# Patient Record
Sex: FEMALE
[De-identification: names, ages, dates, MRNs, and addresses within clinical notes are randomized per-mention and may not be internally consistent; named-entity substitution may affect disease eponyms.]

---

## 2021-01-14 ENCOUNTER — NON-APPOINTMENT (OUTPATIENT)
Age: 86
End: 2021-01-14

## 2021-01-14 ENCOUNTER — APPOINTMENT (OUTPATIENT)
Dept: OPHTHALMOLOGY | Facility: CLINIC | Age: 86
End: 2021-01-14
Payer: MEDICARE

## 2021-01-14 PROCEDURE — 92002 INTRM OPH EXAM NEW PATIENT: CPT

## 2021-09-24 ENCOUNTER — NON-APPOINTMENT (OUTPATIENT)
Age: 86
End: 2021-09-24

## 2021-09-24 ENCOUNTER — APPOINTMENT (OUTPATIENT)
Dept: OPHTHALMOLOGY | Facility: CLINIC | Age: 86
End: 2021-09-24

## 2021-09-24 PROBLEM — Z00.00 ENCOUNTER FOR PREVENTIVE HEALTH EXAMINATION: Status: ACTIVE | Noted: 2021-09-24

## 2021-09-27 ENCOUNTER — NON-APPOINTMENT (OUTPATIENT)
Age: 86
End: 2021-09-27

## 2021-09-27 ENCOUNTER — APPOINTMENT (OUTPATIENT)
Dept: OPHTHALMOLOGY | Facility: CLINIC | Age: 86
End: 2021-09-27
Payer: MEDICARE

## 2021-09-27 PROCEDURE — 92002 INTRM OPH EXAM NEW PATIENT: CPT

## 2022-08-09 ENCOUNTER — NON-APPOINTMENT (OUTPATIENT)
Age: 87
End: 2022-08-09

## 2022-08-09 ENCOUNTER — APPOINTMENT (OUTPATIENT)
Dept: GERIATRICS | Facility: CLINIC | Age: 87
End: 2022-08-09

## 2022-08-09 VITALS
BODY MASS INDEX: 25.52 KG/M2 | DIASTOLIC BLOOD PRESSURE: 60 MMHG | SYSTOLIC BLOOD PRESSURE: 100 MMHG | OXYGEN SATURATION: 96 % | HEART RATE: 85 BPM | WEIGHT: 130 LBS | HEIGHT: 60 IN | RESPIRATION RATE: 16 BRPM

## 2022-08-09 DIAGNOSIS — T46.2X1A POISONING BY OTHER ANTIDYSRHYTHMIC DRUGS, ACCIDENTAL (UNINTENTIONAL), INITIAL ENCOUNTER: ICD-10-CM

## 2022-08-09 PROCEDURE — 93000 ELECTROCARDIOGRAM COMPLETE: CPT | Mod: 59

## 2022-08-09 PROCEDURE — G0444 DEPRESSION SCREEN ANNUAL: CPT | Mod: 59

## 2022-08-12 LAB
ALBUMIN SERPL ELPH-MCNC: 3.9 G/DL
ALP BLD-CCNC: 82 U/L
ALT SERPL-CCNC: 23 U/L
ANION GAP SERPL CALC-SCNC: 10 MMOL/L
AST SERPL-CCNC: 21 U/L
BASOPHILS # BLD AUTO: 0.02 K/UL
BASOPHILS NFR BLD AUTO: 0.3 %
BILIRUB SERPL-MCNC: 0.4 MG/DL
BUN SERPL-MCNC: 42 MG/DL
CALCIUM SERPL-MCNC: 9.4 MG/DL
CHLORIDE SERPL-SCNC: 103 MMOL/L
CHOLEST SERPL-MCNC: 229 MG/DL
CO2 SERPL-SCNC: 25 MMOL/L
CREAT SERPL-MCNC: 1.71 MG/DL
EGFR: 28 ML/MIN/1.73M2
EOSINOPHIL # BLD AUTO: 0.08 K/UL
EOSINOPHIL NFR BLD AUTO: 1.3 %
GLUCOSE SERPL-MCNC: 105 MG/DL
HCT VFR BLD CALC: 40 %
HDLC SERPL-MCNC: 78 MG/DL
HGB BLD-MCNC: 13.2 G/DL
IMM GRANULOCYTES NFR BLD AUTO: 0.3 %
LDLC SERPL CALC-MCNC: 128 MG/DL
LYMPHOCYTES # BLD AUTO: 1.7 K/UL
LYMPHOCYTES NFR BLD AUTO: 27.3 %
MAN DIFF?: NORMAL
MCHC RBC-ENTMCNC: 32.4 PG
MCHC RBC-ENTMCNC: 33 GM/DL
MCV RBC AUTO: 98 FL
MONOCYTES # BLD AUTO: 0.62 K/UL
MONOCYTES NFR BLD AUTO: 10 %
NEUTROPHILS # BLD AUTO: 3.79 K/UL
NEUTROPHILS NFR BLD AUTO: 60.8 %
NONHDLC SERPL-MCNC: 151 MG/DL
PLATELET # BLD AUTO: 90 K/UL
POTASSIUM SERPL-SCNC: 4.7 MMOL/L
PROT SERPL-MCNC: 6.3 G/DL
RBC # BLD: 4.08 M/UL
RBC # FLD: 14.2 %
SODIUM SERPL-SCNC: 138 MMOL/L
TRIGL SERPL-MCNC: 115 MG/DL
TSH SERPL-ACNC: 3 UIU/ML
WBC # FLD AUTO: 6.23 K/UL

## 2022-08-18 PROBLEM — T46.2X1A AMIODARONE TOXICITY: Status: ACTIVE | Noted: 2022-08-09

## 2022-08-18 NOTE — HISTORY OF PRESENT ILLNESS
[Patient reported osteoporosis screening was abnormal] : Patient reported osteoporosis screening was abnormal [Patient reported hearing was normal] : Patient reported hearing was normal [Patient reported vision is normal] : Patient reported vision is normal [Patient reported Patient reported dental screening is normal] : Patient reported dental screening is normal [Patient declined colon rectal/cancer screening] : Patient declined colon/rectal cancer screening [Patient reported skin cancer screening was normal] : Patient reported skin cancer screening was normal [Patient declined breast sonogram] : Patient declined breast sonogram [Patient declined cervical cancer screening] : Patient declined cervical cancer screening [No falls in past year] : Patient reported no falls in the past year [1] : 2) Feeling down, depressed, or hopeless for several days (1) [I have developed a follow-up plan documented below in the note.] : I have developed a follow-up plan documented below in the note. [Designated Healthcare Proxy] : Designated healthcare proxy [Name: ___] : Health Care Proxy's Name: [unfilled]  [Relationship: ___] : Relationship: [unfilled] [FreeTextEntry1] : 88 yo female , retired physician (Geriatrician)with sob, pulm htn, [facility] Chang Bermeo MD.  Pt had covid, now on home oxygen.  Pt was hospitalized and now home Pt has stable weight following hospitalization for remdesivir one month ago - July 15.\par \par Dyspneic - small concentrtor for oxygen used. \par \par Pt ECHO shows MR  and has pulm htn(?) and covid. Also has afib and was on amiodarone\par \par Pt has not had pulmonary rehab ever. 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

## 2022-08-18 NOTE — REVIEW OF SYSTEMS
[Feeling Tired] : feeling tired [As Noted in HPI] : as noted in HPI [Depression] : depression [Negative] : Heme/Lymph [Fever] : no fever [Chills] : no chills [Feeling Poorly] : not feeling poorly

## 2022-08-18 NOTE — PHYSICAL EXAM
[No Acc Muscle Use] : no accessory muscle use [Auscultation Breath Sounds / Voice Sounds] : lungs were clear to auscultation bilaterally [Normal] : no focal deficits [Oriented To Time, Place, And Person] : oriented to person, place, and time

## 2022-08-18 NOTE — SOCIAL HISTORY
[With spouse] : lives with spouse [Apartment] : in an apartment [FreeTextEntry1] : Jo Ann Aly [FreeTextEntry4] : dtr

## 2022-10-24 ENCOUNTER — NON-APPOINTMENT (OUTPATIENT)
Age: 87
End: 2022-10-24

## 2022-11-04 ENCOUNTER — TRANSCRIPTION ENCOUNTER (OUTPATIENT)
Age: 87
End: 2022-11-04

## 2022-11-08 ENCOUNTER — TRANSCRIPTION ENCOUNTER (OUTPATIENT)
Age: 87
End: 2022-11-08

## 2022-11-09 ENCOUNTER — TRANSCRIPTION ENCOUNTER (OUTPATIENT)
Age: 87
End: 2022-11-09

## 2022-11-10 ENCOUNTER — NON-APPOINTMENT (OUTPATIENT)
Age: 87
End: 2022-11-10

## 2022-11-10 ENCOUNTER — TRANSCRIPTION ENCOUNTER (OUTPATIENT)
Age: 87
End: 2022-11-10

## 2022-11-11 ENCOUNTER — APPOINTMENT (OUTPATIENT)
Dept: GERIATRICS | Facility: CLINIC | Age: 87
End: 2022-11-11

## 2022-11-11 PROCEDURE — 99214 OFFICE O/P EST MOD 30 MIN: CPT | Mod: 95

## 2022-11-14 ENCOUNTER — TRANSCRIPTION ENCOUNTER (OUTPATIENT)
Age: 87
End: 2022-11-14

## 2022-11-14 NOTE — REVIEW OF SYSTEMS
[Feeling Poorly] : not feeling poorly [Feeling Tired] : not feeling tired [Chest Pain] : no chest pain [Palpitations] : no palpitations [Cough] : no cough [SOB on Exertion] : no shortness of breath during exertion [Constipation] : no constipation [Anxiety] : no anxiety [Depression] : no depression

## 2022-11-14 NOTE — HISTORY OF PRESENT ILLNESS
[Home] : at home, [unfilled] , at the time of the visit. [Medical Office: (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 Pt. being seen for acute telehealth for wound on right shin.  \par \par Pt. reported she was at the [employer] for an exhibition Monday 11/7/22 . It was a crowded area,she bumped her right leg and it started bleeding.  No other fall/injury. Reports she was putting xeroform but it is really hard to bend down to place a dressing. Pt. needs assistance with ambulating. \par \par

## 2022-11-22 ENCOUNTER — APPOINTMENT (OUTPATIENT)
Dept: GERIATRICS | Facility: CLINIC | Age: 87
End: 2022-11-22

## 2022-11-22 PROCEDURE — 99215 OFFICE O/P EST HI 40 MIN: CPT | Mod: 95

## 2022-11-23 NOTE — PHYSICAL EXAM
[Alert] : alert [No Acute Distress] : in no acute distress [No Respiratory Distress] : no respiratory distress [No Acc Muscle Use] : no accessory muscle use [Respiration, Rhythm And Depth] : normal respiratory rhythm and effort [Oriented To Time, Place, And Person] : oriented to person, place, and time [Normal Affect] : the affect was normal [Normal Insight/Judgment] : insight and judgment were intact [Normal Mood] : the mood was normal [de-identified] : RLE shin with healing, periwound skin erythematous, no exudate

## 2022-11-23 NOTE — HISTORY OF PRESENT ILLNESS
[FreeTextEntry1] : ELAINE LEVENTHAL is an 89 yo woman with PMH of pulmonary HTN, HTN, afib, (on eliquis 2.5 mg bid), CHF, scoliosis and recurrent skin tears who presents for follow up evaluation of RLE wound.\par \par RLE wound:\par -initially seen on 11/11 for wound evaluation \par -patient is receiving wound care services at home\par -she has significant scoliosis and is unable to bend in order to do wound care independently \par -started on keflex 250mg bid on 11/15\par -sent picture of wound to practice from yesterday when wound care nurse was doing dressing change\par -wound itself does not appear infected, but periwound skin is still red\par -patient states that it has improved significantly since starting abx; states that b/l LE are typically warm/red due to chronic vascular disease \par -states that wound is still a bit painful; taking tylenol 1000mg  prn \par -mild swelling of RLE, denies calf pain  [0] : 2) Feeling down, depressed, or hopeless: Not at all (0) [PHQ-2 Negative - No further assessment needed] : PHQ-2 Negative - No further assessment needed [XMR3Mgkyr] : 0

## 2022-11-23 NOTE — REVIEW OF SYSTEMS
[Feeling Poorly] : not feeling poorly [Feeling Tired] : not feeling tired [Discharge From Eyes] : no purulent discharge from the eyes [Dry Eyes] : no dryness of the eyes [Nasal Discharge] : no nasal discharge [Sore Throat] : no sore throat [Chest Pain] : no chest pain [Palpitations] : no palpitations [Shortness Of Breath] : no shortness of breath [Wheezing] : no wheezing [Cough] : no cough [SOB on Exertion] : no shortness of breath during exertion [Abdominal Pain] : no abdominal pain [Vomiting] : no vomiting [Constipation] : no constipation [Diarrhea] : no diarrhea [Dysuria] : no dysuria [Limb Pain] : no limb pain [Skin Wound] : skin wound

## 2022-11-29 ENCOUNTER — APPOINTMENT (OUTPATIENT)
Dept: GERIATRICS | Facility: CLINIC | Age: 87
End: 2022-11-29

## 2022-11-29 PROCEDURE — 99215 OFFICE O/P EST HI 40 MIN: CPT | Mod: 95

## 2022-12-04 NOTE — HISTORY OF PRESENT ILLNESS
[0] : 2) Feeling down, depressed, or hopeless: Not at all (0) [PHQ-2 Negative - No further assessment needed] : PHQ-2 Negative - No further assessment needed [FreeTextEntry1] : ELAINE LEVENTHAL is an 88 yo woman with PMH of pulmonary HTN, HTN, afib, (on eliquis 2.5 mg bid), CHF, scoliosis and recurrent skin tears who presents for follow up evaluation of RLE wound.\par \par RLE wound:\par -initially seen on 11/11 for wound evaluation \par -patient is receiving wound care services at home tid\par -she has significant scoliosis and is unable to bend in order to do wound care independently \par -started on keflex 250mg bid on 11/15, completed on 11/25/22\par -states that wound looks like it has improved; dressing was just changed earlier today  \par -states that wound is still a bit painful; taking tylenol 1000mg  prn \par  [AMQ8Kutsq] : 0

## 2022-12-04 NOTE — REASON FOR VISIT
[Follow-Up] : a follow-up visit [Home] : at home, [unfilled] , at the time of the visit. [Medical Office: ([facility])___] : at the medical office located in  [Patient] : the patient [This encounter was initiated by telehealth (audio with video) and converted to telephone (audio only) due to technical difficulties.] : This encounter was initiated by telehealth (audio with video) and converted to telephone (audio only) due to technical difficulties. [FreeTextEntry1] : wound

## 2022-12-04 NOTE — PHYSICAL EXAM
[Alert] : alert [No Respiratory Distress] : no respiratory distress [No Acc Muscle Use] : no accessory muscle use [Respiration, Rhythm And Depth] : normal respiratory rhythm and effort [Oriented To Time, Place, And Person] : oriented to person, place, and time [Normal Affect] : the affect was normal [Normal Insight/Judgment] : insight and judgment were intact [Normal Mood] : the mood was normal [de-identified] : RLE shin with healing wound

## 2022-12-04 NOTE — REVIEW OF SYSTEMS
[Skin Wound] : skin wound [Feeling Poorly] : not feeling poorly [Feeling Tired] : not feeling tired [Discharge From Eyes] : no purulent discharge from the eyes [Dry Eyes] : no dryness of the eyes [Nasal Discharge] : no nasal discharge [Sore Throat] : no sore throat [Chest Pain] : no chest pain [Palpitations] : no palpitations [Shortness Of Breath] : no shortness of breath [Wheezing] : no wheezing [Cough] : no cough [SOB on Exertion] : no shortness of breath during exertion [Abdominal Pain] : no abdominal pain [Vomiting] : no vomiting [Constipation] : no constipation [Diarrhea] : no diarrhea [Dysuria] : no dysuria [Limb Pain] : no limb pain

## 2022-12-04 NOTE — ASSESSMENT
[FreeTextEntry1] : follow up with Dr. Vasquez on 12/15/22; available earlier PRN\par \par Maria Esther Tomlin, FNP-BC

## 2022-12-05 ENCOUNTER — APPOINTMENT (OUTPATIENT)
Dept: WOUND CARE | Facility: CLINIC | Age: 87
End: 2022-12-05

## 2022-12-05 DIAGNOSIS — Z87.39 PERSONAL HISTORY OF OTHER DISEASES OF THE MUSCULOSKELETAL SYSTEM AND CONNECTIVE TISSUE: ICD-10-CM

## 2022-12-05 PROCEDURE — 99423 OL DIG E/M SVC 21+ MIN: CPT

## 2022-12-05 NOTE — ASSESSMENT
[FreeTextEntry1] : Wound Assessment and Plan:\par \par The patient presents with a wound to the RLE-.  Swelling noted to the extremity with erythema secondary to venous stasis. Erythema marked on skin by daughter to note for any increase. Daughter states that the pulses are weakly palpable.\par THIAGO/PVR and standing venous reflux study scheduled for follow up vascular surgery appointment\par No clinical sign of infection at this time.\par Recommendation:\par \par Apply lidocaine or topical anesthetic if needed to reduce pain upon washing the wound.\par Wash wound with ----0.9% saline/ Dakins 0.125%\par Apply ----Johanna once wound is granulating\par Apply ----tubigrip\par Change dressing ---daily/ every other day/ 3 times per week.\par Leg elevation as tolerated\par Encouraged ambulation or exercise.\par Optimization of nutrition.\par Offloading to the wound site.\par \par -----Wound supplies ordered via DME\par Patient given contact information to [facility]\par \par -----Homecare orders in place\[phone]\par She is only able to order Dakins 0.125% and will mechanically debride the wound  3 times per week\par \par Follow up appointment scheduled for  1 week\par \par TeleHealth Services discussed with the patient and/or family.  Discharge instructions given including download of [place] information regarding:\par 1)  [facility] to obtain medical records\par 2)  [facility] to conduct Face-to-Face TeleHealth visit\par 3)  [facility] for the Patient (patient takes a picture of their wound which is sent to the patient's chart for review)\par

## 2022-12-05 NOTE — HISTORY OF PRESENT ILLNESS
[Home] : at home, [unfilled] , at the time of the visit. [Medical Office: ([facility])___] : at the medical office located in  [Spouse] : spouse [Family Member] : family member [Verbal consent obtained from patient] : the patient, [unfilled] [FreeTextEntry3] : D [FreeTextEntry4] : daughter [FreeTextEntry1] : Ms. 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 The wound is located on  the RLE and nonhealing .  The patient has complaints of painful wound.   The patient has been dressing the wound with adaptic and mupirocin-. Patient can't see what it looks like. The patient denies fevers or chills.  The patient has localized pain to the wound upon dressing changes.  The patient has no other complaints or associated symptoms. Her daughter,  Jo Ann Aly is present with her .  She is receiving care through [facility] by Haritha at [phone]\par

## 2022-12-05 NOTE — PLAN
[FreeTextEntry1] : Patient to follow up with vascular surgery at [facility] for further workup.\par

## 2022-12-14 ENCOUNTER — APPOINTMENT (OUTPATIENT)
Dept: WOUND CARE | Facility: CLINIC | Age: 87
End: 2022-12-14

## 2022-12-14 PROCEDURE — 99213 OFFICE O/P EST LOW 20 MIN: CPT | Mod: 95

## 2022-12-15 ENCOUNTER — APPOINTMENT (OUTPATIENT)
Dept: GERIATRICS | Facility: CLINIC | Age: 87
End: 2022-12-15

## 2022-12-15 VITALS
TEMPERATURE: 98 F | WEIGHT: 135 LBS | HEART RATE: 68 BPM | SYSTOLIC BLOOD PRESSURE: 90 MMHG | DIASTOLIC BLOOD PRESSURE: 56 MMHG | OXYGEN SATURATION: 97 % | RESPIRATION RATE: 15 BRPM | BODY MASS INDEX: 26.37 KG/M2

## 2022-12-15 PROCEDURE — 99214 OFFICE O/P EST MOD 30 MIN: CPT

## 2022-12-15 RX ORDER — CEPHALEXIN 250 MG/1
250 CAPSULE ORAL
Qty: 6 | Refills: 0 | Status: COMPLETED | COMMUNITY
Start: 2022-11-15 | End: 2022-12-15

## 2022-12-15 RX ORDER — CEPHALEXIN 500 MG/1
500 CAPSULE ORAL
Qty: 14 | Refills: 0 | Status: COMPLETED | COMMUNITY
Start: 2022-08-15 | End: 2022-12-15

## 2022-12-15 NOTE — ASSESSMENT
[FreeTextEntry1] : Wound Assessment and Plan:\par \par The patient presents with a wound to the RLE-.  Swelling noted to the extremity with erythema secondary to venous stasis. Erythema marked on skin by daughter to note for any increase. Daughter states that the pulses are weakly palpable.\par THIAGO/PVR and standing venous reflux study scheduled for follow up vascular surgery appointment\par No clinical sign of infection at this time.\par Recommendation:\par \par Apply lidocaine or topical anesthetic if needed to reduce pain upon washing the wound.\par Wash wound with ----0.9% saline/ Dakins 0.125%\par Apply ----Johanna once wound is granulating\par Apply ----tubigrip\par Change dressing ---daily/ every other day/ 3 times per week.\par Leg elevation as tolerated\par Encouraged ambulation or exercise.\par Optimization of nutrition.\par Offloading to the wound site.\par \par -----Wound supplies ordered via DME\par Patient given contact information to DME\par \par -----Homecare orders in place\par [facility] [phone]\par She is only able to order Dakins 0.125% and will mechanically debride the wound  3 times per week\par \par Follow up appointment scheduled for  1 week\par \par TeleHealth Services discussed with the patient and/or family.  Discharge instructions given including download of [facility] information regarding:\par 1)  [facility] to obtain medical records\par 2)  [facility] to conduct Face-to-Face TeleHealth visit\par 3)  [facility] for the Patient (patient takes a picture of their wound which is sent to the patient's chart for review)\par 12/14/22\par right calf wound improved since last visit, black scabbing is lifting, exposing partial pink tissue\par reports frustration with trying to secure a appt. with vascular at [facility], will contact and relay message

## 2022-12-15 NOTE — HISTORY OF PRESENT ILLNESS
[Home] : at home, [unfilled] , at the time of the visit. [Medical Office: ([facility])___] : at the medical office located in  [Spouse] : spouse [Family Member] : family member [Verbal consent obtained from patient] : the patient, [unfilled] [FreeTextEntry1] : Ms. 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 The wound is located on  the RLE and nonhealing .  The patient has complaints of painful wound.   The patient has been dressing the wound with adaptic and mupirocin-. Patient can't see what it looks like. The patient denies fevers or chills.  The patient has localized pain to the wound upon dressing changes.  The patient has no other complaints or associated symptoms. Her daughter,  Jo Ann Aly is present with her .  She is receiving care through [facility] by Haritha at [phone]\par  [FreeTextEntry4] : daughter

## 2022-12-15 NOTE — PLAN
[FreeTextEntry1] : 12/14/22\par Plan - reached out to Dr. Aviles office, staff will reach out to pt. to set up vascular evaluation\par c/w dakins moistened to leg, pt. has nurse\par follow up TEB

## 2022-12-20 NOTE — PHYSICAL EXAM
[Normal] : no focal deficits [Oriented To Time, Place, And Person] : oriented to person, place, and time [No Respiratory Distress] : no respiratory distress [No Acc Muscle Use] : no accessory muscle use [Respiration, Rhythm And Depth] : normal respiratory rhythm and effort [Pedal Pulses Normal] : the pedal pulses are present [de-identified] : Scattered end-expiratory wheezing  [de-identified] : Trace non-pitting bilateral lower extremity edema, R>L  [de-identified] : Wound on anterior aspect of RLE, healing with granulation tissue noted. No pus/purulent discharge.

## 2022-12-20 NOTE — REVIEW OF SYSTEMS
[As Noted in HPI] : as noted in HPI [Depression] : depression [Negative] : Endocrine [Lower Ext Edema] : lower extremity edema [Easy Bleeding] : a tendency for easy bleeding [Fever] : no fever [Chills] : no chills [Feeling Poorly] : not feeling poorly [Feeling Tired] : not feeling tired [Heart Rate Is Slow] : the heart rate was not slow [Heart Rate Is Fast] : the heart rate was not fast [Chest Pain] : no chest pain [Palpitations] : no palpitations

## 2022-12-20 NOTE — HISTORY OF PRESENT ILLNESS
[Patient reported osteoporosis screening was abnormal] : Patient reported osteoporosis screening was abnormal [Patient reported hearing was normal] : Patient reported hearing was normal [Patient reported vision is normal] : Patient reported vision is normal [Patient reported Patient reported dental screening is normal] : Patient reported dental screening is normal [Patient declined colon rectal/cancer screening] : Patient declined colon/rectal cancer screening [Patient reported skin cancer screening was normal] : Patient reported skin cancer screening was normal [Patient declined breast sonogram] : Patient declined breast sonogram [Patient declined cervical cancer screening] : Patient declined cervical cancer screening [No falls in past year] : Patient reported no falls in the past year [1] : 2) Feeling down, depressed, or hopeless for several days (1) [I have developed a follow-up plan documented below in the note.] : I have developed a follow-up plan documented below in the note. [Designated Healthcare Proxy] : Designated healthcare proxy [Name: ___] : Health Care Proxy's Name: [unfilled]  [Reviewed no changes] : Reviewed, no changes [Relationship: ___] : Relationship: [unfilled] [I will adhere to the patient's wishes.] : I will adhere to the patient's wishes. 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 \par \par Since last office visit, no new falls, ED/urgent care/hospital visits. She had RSV after Thanksgiving - now recovering from symptoms. \par \par ?Pulm HTN: Prior echocardiogram significant for mild-mod MR, mod TR, trivial NC, hyperdynamic LVSF (EF 70%). She started pulmonary rehabilitation for deconditioning - good experience so far. \par \par RLE Wound/Cellulitis: She is still being followed by visiting nurses ([facility]NS) for wound care sustained in Nov 2022 after hitting concentrator. She finished course of Keflex approximately one week ago.  She notes yellow drainage on gauze - though improving. She takes Tylenol as needed for pain.\par \par Caregiver [place]/Depression:  Leventhal is caregiver for her  - which is a source of stress. She reports that she is sleeping and eating well and participating in activities she enjoys, including going to the theater and [place]. \par \par Immunizations: UTD with Flu and Covid vaccines. She reports she received the Shingles vaccine "many years ago."  [AdvancecareDate] : 12/22

## 2022-12-20 NOTE — SOCIAL HISTORY
[With spouse] : lives with spouse [Apartment] : in an apartment [FreeTextEntry1] : Jo Ann Aly [FreeTextEntry4] : Daughter

## 2022-12-20 NOTE — END OF VISIT
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  WOund healing and with granulation tissue.  She knows hse is frustrated with her situaiton. She does not think she is depressed and does not want to consider antidepressants. She is doing PT and enjoys it. Feels it is helping her.

## 2022-12-21 ENCOUNTER — APPOINTMENT (OUTPATIENT)
Dept: VASCULAR SURGERY | Facility: CLINIC | Age: 87
End: 2022-12-21

## 2022-12-21 VITALS
SYSTOLIC BLOOD PRESSURE: 112 MMHG | WEIGHT: 130 LBS | HEART RATE: 90 BPM | HEIGHT: 61 IN | DIASTOLIC BLOOD PRESSURE: 74 MMHG | BODY MASS INDEX: 24.55 KG/M2

## 2022-12-21 PROCEDURE — 93971 EXTREMITY STUDY: CPT

## 2022-12-21 RX ORDER — FLUTICASONE PROPIONATE AND SALMETEROL XINAFOATE 230; 21 UG/1; UG/1
230-21 AEROSOL, METERED RESPIRATORY (INHALATION) TWICE DAILY
Refills: 0 | Status: DISCONTINUED | COMMUNITY
Start: 2022-06-30 | End: 2022-12-21

## 2022-12-21 RX ORDER — APIXABAN 2.5 MG/1
2.5 TABLET, FILM COATED ORAL
Qty: 180 | Refills: 0 | Status: DISCONTINUED | COMMUNITY
Start: 2021-06-28 | End: 2022-12-21

## 2022-12-21 RX ORDER — EMPAGLIFLOZIN 10 MG/1
10 TABLET, FILM COATED ORAL DAILY
Refills: 0 | Status: DISCONTINUED | COMMUNITY
Start: 2022-05-23 | End: 2022-12-21

## 2022-12-21 RX ORDER — BETAMETHASONE DIPROPIONATE 0.5 MG/G
0.05 CREAM, AUGMENTED TOPICAL
Qty: 45 | Refills: 0 | Status: DISCONTINUED | COMMUNITY
Start: 2022-04-22 | End: 2022-12-21

## 2022-12-21 RX ORDER — LIDOCAINE AND PRILOCAINE 25; 25 MG/G; MG/G
2.5-2.5 CREAM TOPICAL
Qty: 1 | Refills: 3 | Status: DISCONTINUED | COMMUNITY
Start: 2022-12-05 | End: 2022-12-21

## 2022-12-22 NOTE — ADDENDUM
[FreeTextEntry1] : I, Dr. Duncan Aviles, personally performed the evaluation and management (E/M) services for this new patient.  That E/M includes conducting the initial examination, assessing all conditions, and establishing the plan of care.  Today, my ACP,SRINIVAS Carter, was here to observe my evaluation and management services for this patient to be followed going forward. \par \par \par The documentation for this encounter was entered by Michel Hyatt acting as a scribe for Dr.Gary Aviles.\par

## 2022-12-22 NOTE — ASSESSMENT
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 \par On exam, palpable peripheral pulse,  mild edema noted over the anterior shin. R anterior shin superficial 2x1 cm ulcer with partial exudate, no surrounding erythema.\par RLE Venous US demonstrates negative DVT/SVT. No reflux. Normal flow in the pop, PT, and peroneal arteries. Edema noted over the anterior shin. Small tributary measuring 1.3 mm. Wound cleaned in office today and wrapped in ace bandage. \par No vascular intervention is necessary at this time.\par Plan:\par - I recommend cleaning wound with soap and water daily. Wound care instructions given to clean the wound with hydrogen peroxide and to apply a small amount of  bacterin, then wrap R leg with ace bandage. \par - FU next week to monitor wound. \par \par \par \par

## 2022-12-22 NOTE — PHYSICAL EXAM
[Respiratory Effort] : normal respiratory effort [Alert] : alert [Oriented to Person] : oriented to person [Oriented to Place] : oriented to place [Oriented to Time] : oriented to time [Calm] : calm [0] : right 0 [Normal Rate and Rhythm] : normal rate and rhythm [2+] : right 2+ [Ankle Swelling (On Exam)] : present [Ankle Swelling Bilaterally] : bilaterally  [Ankle Swelling On The Left] : moderate [Abdomen Tenderness] : ~T ~M No abdominal tenderness [de-identified] : WN/WD, NAD [de-identified] : NC/AT [de-identified] : supple [FreeTextEntry1] : Palpable popliteal and femoral pulses bilaterally. Non palpable pulses over the R PT. Good arterial signal over the RLE heard with hand held doppler. Chronic venous stasis of bilateral LEs.  [de-identified] : FROM [de-identified] : R anterior shin superficial 2x1 cm ulcer with partial exudate, no surrounding erythema [de-identified] : grossly intact

## 2022-12-22 NOTE — PROCEDURE
[FreeTextEntry1] : RLE Venous doppler was done in the office today that reveals negative DVT/SVT. No reflux. Normal flow in the pop, PT, and peroneal arteries. Edema noted over the anterior shin. Small tributary measuring 1.3 mm. \par \par Wound cleaned with Hydrogen peroxide, Bacitracin was applied, covered with Telfa pad, leg was wrapped  with ace bandage. \par

## 2022-12-22 NOTE — HISTORY OF PRESENT ILLNESS
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 She has a PMHx of Afib (on Eliquis), HLD, depression, COPD, Amiodarone toxicity, scoliosis, osteoporosis, RSV, DVT >20 yrs ago, and RLE cellulitis and slow healing wound. She explains the wound is located over the R shin. She had bumped her leg  into a concrete structure during an exhibition at the [facility] a few months ago. She has been dressing the wound with Kerlix. It has finally started showing some signs of improvement. She has been following instructions per Dr Heck and VNS is performing wound care 3x a week. She has been cleaning with Dakin's solution, dressing it with Adaptic and applying mupirocin. She explains it is painful. Denies any fever/chills, symptoms c/w claudication, rest pain, leg swelling. \par \par \par SHx:\par -Patient a retired physician.\par \par .

## 2022-12-23 LAB
ANION GAP SERPL CALC-SCNC: 15 MMOL/L
BASOPHILS # BLD AUTO: 0.03 K/UL
BASOPHILS NFR BLD AUTO: 0.4 %
BUN SERPL-MCNC: 37 MG/DL
CALCIUM SERPL-MCNC: 9.3 MG/DL
CHLORIDE SERPL-SCNC: 100 MMOL/L
CO2 SERPL-SCNC: 22 MMOL/L
CREAT SERPL-MCNC: 1.9 MG/DL
EGFR: 25 ML/MIN/1.73M2
EOSINOPHIL # BLD AUTO: 0.04 K/UL
EOSINOPHIL NFR BLD AUTO: 0.5 %
GLUCOSE SERPL-MCNC: 115 MG/DL
HCT VFR BLD CALC: 40.3 %
HGB BLD-MCNC: 13 G/DL
IMM GRANULOCYTES NFR BLD AUTO: 0.7 %
LYMPHOCYTES # BLD AUTO: 1.6 K/UL
LYMPHOCYTES NFR BLD AUTO: 21.4 %
MAN DIFF?: NORMAL
MCHC RBC-ENTMCNC: 32.3 GM/DL
MCHC RBC-ENTMCNC: 33.6 PG
MCV RBC AUTO: 104.1 FL
MONOCYTES # BLD AUTO: 0.69 K/UL
MONOCYTES NFR BLD AUTO: 9.2 %
NEUTROPHILS # BLD AUTO: 5.07 K/UL
NEUTROPHILS NFR BLD AUTO: 67.8 %
PLATELET # BLD AUTO: 121 K/UL
POTASSIUM SERPL-SCNC: 5.1 MMOL/L
RBC # BLD: 3.87 M/UL
RBC # FLD: 13.7 %
SODIUM SERPL-SCNC: 137 MMOL/L
WBC # FLD AUTO: 7.48 K/UL

## 2022-12-28 ENCOUNTER — APPOINTMENT (OUTPATIENT)
Dept: VASCULAR SURGERY | Facility: CLINIC | Age: 87
End: 2022-12-28
Payer: MEDICARE

## 2022-12-28 VITALS
BODY MASS INDEX: 24.55 KG/M2 | HEART RATE: 87 BPM | SYSTOLIC BLOOD PRESSURE: 92 MMHG | HEIGHT: 61 IN | WEIGHT: 130 LBS | DIASTOLIC BLOOD PRESSURE: 60 MMHG

## 2022-12-28 PROCEDURE — 99213 OFFICE O/P EST LOW 20 MIN: CPT | Mod: 25

## 2022-12-28 PROCEDURE — 29580 STRAPPING UNNA BOOT: CPT | Mod: RT

## 2022-12-28 NOTE — HISTORY OF PRESENT ILLNESS
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 She has a PMHx of Afib (on Eliquis), HLD, depression, COPD, Amiodarone toxicity, scoliosis, osteoporosis, RSV, DVT >20 yrs ago, and RLE cellulitis and slow healing, traumatic wound. She was seen last week and presents for followup. She reports it is difficult for her to keep the telfa on at night and has been securing it with paper tape. She also started using the velcro lymphedema wraps given the ace bandages will compress only to a certain point in the leg and the rest will swell up significantly. Denies any fever/chills, symptoms c/w claudication, rest pain, leg swelling. It is hard for her to stay resting with leg elevated. \par \par \par SHx:\par -Patient a retired physician.

## 2022-12-28 NOTE — DATA REVIEWED
[FreeTextEntry1] : RLE Venous doppler 12/21/22: negative DVT/SVT. No reflux. Normal flow in the pop, PT, and peroneal arteries. Edema noted over the anterior shin. Small tributary measuring 1.3 mm.

## 2022-12-28 NOTE — ASSESSMENT
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 \par On exam, palpable peripheral pulse,  mild edema noted over the anterior shin. R anterior shin superficial 2x1 cm ulcer with partial exudate, no surrounding erythema.\par Wound cleaned in office today and wrapped in unna boot, kerlix and ace bandage.\par FU next week to monitor wound. \par \par \par \par

## 2022-12-28 NOTE — PHYSICAL EXAM
[Respiratory Effort] : normal respiratory effort [Normal Rate and Rhythm] : normal rate and rhythm [0] : right 0 [2+] : left 2+ [Ankle Swelling (On Exam)] : present [Ankle Swelling Bilaterally] : bilaterally  [Ankle Swelling On The Left] : moderate [Alert] : alert [Oriented to Person] : oriented to person [Oriented to Place] : oriented to place [Oriented to Time] : oriented to time [Calm] : calm [Abdomen Tenderness] : ~T ~M No abdominal tenderness [de-identified] : WN/WD, NAD [de-identified] : NC/AT [de-identified] : supple [FreeTextEntry1] : Palpable popliteal and femoral pulses bilaterally. Non palpable pulses over the R PT. Chronic venous stasis of bilateral LEs.  [de-identified] : FROM [de-identified] : R anterior shin superficial ~2x1 cm ulcer with partial exudate, no surrounding erythema [de-identified] : grossly intact

## 2022-12-28 NOTE — PROCEDURE
[FreeTextEntry1] : Wound cleaned with Hydrogen peroxide, A&D applied, leg was wrapped with unna boot, kerlix and ace bandage. \par

## 2023-01-04 ENCOUNTER — APPOINTMENT (OUTPATIENT)
Dept: VASCULAR SURGERY | Facility: CLINIC | Age: 88
End: 2023-01-04
Payer: MEDICARE

## 2023-01-04 VITALS
DIASTOLIC BLOOD PRESSURE: 61 MMHG | HEART RATE: 67 BPM | WEIGHT: 130 LBS | BODY MASS INDEX: 24.55 KG/M2 | HEIGHT: 61 IN | SYSTOLIC BLOOD PRESSURE: 91 MMHG

## 2023-01-04 PROCEDURE — 29580 STRAPPING UNNA BOOT: CPT | Mod: RT

## 2023-01-04 PROCEDURE — 99213 OFFICE O/P EST LOW 20 MIN: CPT | Mod: 25

## 2023-01-05 NOTE — ADDENDUM
[FreeTextEntry1] : I, Dr. Duncan Aviles, personally performed the evaluation and management (E/M) services for this established patient who presents today with (a) new problem(s)/exacerbation of (an) existing condition(s).  That E/M includes conducting the examination, assessing all new/exacerbated conditions, and establishing a new plan of care.  Today, my ACP, Eva Gustafson NP, was here to observe my evaluation and management services for this new problem/exacerbated condition to be followed going forward. \par \par The documentation for this encounter was entered by Michel Hyatt acting as a scribe for Dr.Gary Aviles.\par

## 2023-01-05 NOTE — HISTORY OF PRESENT ILLNESS
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 She has a PMHx of Afib (on Eliquis), HLD, depression, COPD, Amiodarone toxicity, scoliosis, osteoporosis, RSV, DVT >20 yrs ago, and RLE cellulitis and slow healing, traumatic wound. She was seen last week and presents for followup/unna boot change. She noticed swelling has significantly improved since last visit. At some point, the unna boot became loose given the how much of the edema had improved. Denies any fever/chills, symptoms c/w claudication, rest pain, leg swelling. \par \par SHx:\par -Patient a retired physician.

## 2023-01-05 NOTE — PHYSICAL EXAM
[Respiratory Effort] : normal respiratory effort [Normal Rate and Rhythm] : normal rate and rhythm [0] : right 0 [2+] : left 2+ [Ankle Swelling (On Exam)] : present [Ankle Swelling Bilaterally] : bilaterally  [Alert] : alert [Oriented to Person] : oriented to person [Oriented to Place] : oriented to place [Oriented to Time] : oriented to time [Calm] : calm [Ankle Swelling On The Right] : mild [Abdomen Tenderness] : ~T ~M No abdominal tenderness [de-identified] : WN/WD, NAD [de-identified] : NC/AT [de-identified] : supple [FreeTextEntry1] : Palpable popliteal and femoral pulses bilaterally. Non palpable pulses over the R PT. Chronic venous stasis of bilateral LEs.  [de-identified] : FROM [de-identified] : R anterior shin superficial ~1x1 cm superficial ulcer with dry scab formation, no surrounding erythema. Lateral to wound, small skin tear, superficial and no signs of infection.  [de-identified] : grossly intact

## 2023-01-05 NOTE — ASSESSMENT
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 \par On exam, palpable peripheral pulse,  mild edema noted over the anterior shin. R anterior shin superficial 1x1 cm ulcer with scab formation over it, more superficial, no surrounding erythema.\par Wound cleaned in office today and wrapped in unna boot, kerlix and ace bandage.\par FU next week to monitor wound.

## 2023-01-11 ENCOUNTER — APPOINTMENT (OUTPATIENT)
Dept: VASCULAR SURGERY | Facility: CLINIC | Age: 88
End: 2023-01-11
Payer: MEDICARE

## 2023-01-11 PROCEDURE — 99212 OFFICE O/P EST SF 10 MIN: CPT

## 2023-01-12 NOTE — PROCEDURE
[FreeTextEntry1] : Wound cleaned with Hydrogen peroxide, A&D applied, drop of abx applied, leg was wrapped with kerlix and ace bandage. \par

## 2023-01-12 NOTE — HISTORY OF PRESENT ILLNESS
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 She has a PMHx of Afib (on Eliquis), HLD, depression, COPD, Amiodarone toxicity, scoliosis, osteoporosis, RSV, DVT >20 yrs ago, and RLE cellulitis and slow healing, traumatic wound. She was seen last week and presents for followup. She noticed ulcer and swelling has significantly improved since last visit. Denies any fever/chills, symptoms c/w claudication, rest pain, leg swelling. \par \par SHx:\par -Patient a retired physician.

## 2023-01-12 NOTE — ASSESSMENT
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 \par On exam, palpable peripheral pulse,  mild edema noted over the anterior shin. R anterior shin superficial with dry scab formation, no surrounding erythema.\par Wound cleaned in office today and wrapped in kerlix and ace bandage.\par Wound resolving well, she may f.u as needed.

## 2023-01-12 NOTE — ADDENDUM
[FreeTextEntry1] : I, Dr. Duncan Aviles, personally performed the evaluation and management (E/M) services for this established patient who presents today with (a) new problem(s)/exacerbation of (an) existing condition(s).  That E/M includes conducting the examination, assessing all new/exacerbated conditions, and establishing a new plan of care.  Today, my ACP, Eva Gustafson NP, was here to observe my evaluation and management services for this new problem/exacerbated condition to be followed going forward. \par \par The documentation for this encounter was entered by Michel Hyatt acting as a scribe for Dr.Gary Aviles.\par \par

## 2023-01-12 NOTE — PHYSICAL EXAM
[Respiratory Effort] : normal respiratory effort [Normal Rate and Rhythm] : normal rate and rhythm [0] : right 0 [2+] : left 2+ [Ankle Swelling (On Exam)] : present [Alert] : alert [Oriented to Person] : oriented to person [Oriented to Place] : oriented to place [Oriented to Time] : oriented to time [Calm] : calm [] : bilaterally [Ankle Swelling On The Right] : mild [Abdomen Tenderness] : ~T ~M No abdominal tenderness [de-identified] : WN/WD, NAD [de-identified] : NC/AT [de-identified] : supple [FreeTextEntry1] : Palpable popliteal and femoral pulses bilaterally. Non palpable pulses over the R PT. Chronic venous stasis of bilateral LEs.  [de-identified] : FROM [de-identified] : R anterior shin w/ superficial dry scab formation, no surrounding erythema. Lateral to wound, small skin tear, superficial with dry scab and no signs of infection.  [de-identified] : grossly intact

## 2023-02-06 ENCOUNTER — APPOINTMENT (OUTPATIENT)
Dept: VASCULAR SURGERY | Facility: CLINIC | Age: 88
End: 2023-02-06
Payer: MEDICARE

## 2023-02-06 VITALS
SYSTOLIC BLOOD PRESSURE: 89 MMHG | WEIGHT: 130 LBS | DIASTOLIC BLOOD PRESSURE: 54 MMHG | HEART RATE: 80 BPM | HEIGHT: 61 IN | BODY MASS INDEX: 24.55 KG/M2

## 2023-02-06 PROCEDURE — 99213 OFFICE O/P EST LOW 20 MIN: CPT

## 2023-02-07 NOTE — HISTORY OF PRESENT ILLNESS
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 She has a PMHx of Afib (on Eliquis), HLD, depression, COPD, Amiodarone toxicity, scoliosis, osteoporosis, RSV, DVT >20 yrs ago. The right leg wound healed with unna boot treatments and today she presents with left leg traumatic wound. She explains she fell coming out of a cab last week. She has been cleaning it daily, applying triple antibx ointment and covering with Xeroform, gauze and ACE wrap. Denies any fever/chills, symptoms c/w claudication, rest pain.

## 2023-02-07 NOTE — PROCEDURE
[FreeTextEntry1] : Wound cleaned with Hydrogen peroxide, A&D applied around the wound, bacitracin applied, covered with xeroform and leg was wrapped with kerlix and ace bandage. \par

## 2023-02-07 NOTE — PHYSICAL EXAM
[Respiratory Effort] : normal respiratory effort [Normal Rate and Rhythm] : normal rate and rhythm [2+] : left 2+ [Ankle Swelling (On Exam)] : present [] : bilaterally [Ankle Swelling On The Right] : mild [Abdomen Tenderness] : ~T ~M No abdominal tenderness [Alert] : alert [Oriented to Person] : oriented to person [Oriented to Place] : oriented to place [Oriented to Time] : oriented to time [Calm] : calm [de-identified] : WN/WD, NAD [de-identified] : NC/AT [de-identified] : supple [FreeTextEntry1] : L shin with superficial skin abrasion, base of wound with granulation tissue, clean and moist. Surrounding ecchymosis and mild swelling. [de-identified] : FROM [de-identified] : See cardiovascular section  [de-identified] : grossly intact

## 2023-02-07 NOTE — ASSESSMENT
[FreeTextEntry1] : 89 y/o F w/ LLE lower leg traumatic wound. No signs of infection. Base moist with granulation tissue. Surrounding ecchymosis and mild swelling. Wound care provided in the office and pt to continue daily wound care at home. Leg elevation recommended to minimize any swelling to improve wound healing. F/u Fri to monitor wound progress. [Arterial/Venous Disease] : arterial/venous disease [Ulcer Care] : ulcer care

## 2023-02-09 ENCOUNTER — TRANSCRIPTION ENCOUNTER (OUTPATIENT)
Age: 88
End: 2023-02-09

## 2023-02-10 ENCOUNTER — APPOINTMENT (OUTPATIENT)
Dept: VASCULAR SURGERY | Facility: CLINIC | Age: 88
End: 2023-02-10
Payer: MEDICARE

## 2023-02-10 PROCEDURE — 99213 OFFICE O/P EST LOW 20 MIN: CPT

## 2023-02-13 ENCOUNTER — APPOINTMENT (OUTPATIENT)
Dept: VASCULAR SURGERY | Facility: CLINIC | Age: 88
End: 2023-02-13
Payer: MEDICARE

## 2023-02-13 VITALS
DIASTOLIC BLOOD PRESSURE: 69 MMHG | BODY MASS INDEX: 24.55 KG/M2 | WEIGHT: 130 LBS | HEART RATE: 89 BPM | SYSTOLIC BLOOD PRESSURE: 101 MMHG | HEIGHT: 61 IN

## 2023-02-13 PROCEDURE — 99213 OFFICE O/P EST LOW 20 MIN: CPT

## 2023-02-13 NOTE — PHYSICAL EXAM
[Respiratory Effort] : normal respiratory effort [Normal Rate and Rhythm] : normal rate and rhythm [2+] : left 2+ [Ankle Swelling (On Exam)] : present [Ankle Swelling On The Left] : moderate [] : bilaterally [Ankle Swelling On The Right] : mild [Abdomen Tenderness] : ~T ~M No abdominal tenderness [Alert] : alert [Oriented to Person] : oriented to person [Oriented to Place] : oriented to place [Oriented to Time] : oriented to time [Calm] : calm [de-identified] : WN/WD, NAD [de-identified] : NC/AT [de-identified] : supple [FreeTextEntry1] : L shin with large skin abrasion, base of wound with granulation and fibrin tissue, clean and moist. Surrounding ecchymosis (stable) however, moderate to severe swelling and new erythema surrounding the wound [de-identified] : FROM [de-identified] : See cardiovascular section  [de-identified] : grossly intact

## 2023-02-13 NOTE — ASSESSMENT
[FreeTextEntry1] : 89 y/o F w/ LLE lower leg traumatic wound. Now with worsening swelling of LLE and erythema. Base moist with granulation tissue. Surrounding ecchymosis (stable). Wound care provided in the office and pt to continue daily wound care at home. Leg elevation and ace bandage recommended to help decrease the swelling to improve wound healing. Discussed admission to  hospital however, pt does not want to be admitted at this time. Risk, complications and alternatives were discussed, all questions were answered. Bactrim DS sent to local pharmacy. F/u Mon to monitor wound progress. [Arterial/Venous Disease] : arterial/venous disease [Ulcer Care] : ulcer care

## 2023-02-13 NOTE — HISTORY OF PRESENT ILLNESS
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 She has a PMHx of Afib (on Eliquis), HLD, depression, COPD, Amiodarone toxicity, scoliosis, osteoporosis, RSV, DVT >20 yrs ago. The right leg wound healed with unna boot treatments and today she presents with left leg traumatic wound after she fell coming out of a cab. She has been cleaning it daily, applying triple antibx ointment and covering with Xeroform, and gauze. She mentions the ACE wrap during last visit was too tight and she had to remove it. She mentions the leg is more swollen from last visit and the wound hurts a lot. Denies any fever/chills.

## 2023-02-13 NOTE — ASSESSMENT
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  Pt has a PMHx of Afib (on Eliquis), HLD, depression, COPD, Amiodarone toxicity, scoliosis, osteoporosis, RSV, DVT >20 yrs ago.  Pt has been cleaning the wound daily w/chlorhexidine, applying triple Abx ointment, and covering w/Xeroform then gauze/ACE.  As per pt, leg is still swollen but redness and pain have significantly improved.\par \par Erythema resolving in the leg, no longer involving the foot, but swelling persistent.  L pretibial skin avulsion noted, measures 6x8cm w/medial aspect of the wound ischemic w/macerated skin, base of wound granulating but w/mild fibrinous exudate noted in the wound.  Wound cleansed today w/peroxide and chlorhexidine, and dressed w/xeroform/dry gauze/ACE after moisturizing surrounding skin w/Vitamin A&D.  Pt will RTO in 1wk to f/u w/Dr. Aviles. [Arterial/Venous Disease] : arterial/venous disease [Ulcer Care] : ulcer care

## 2023-02-13 NOTE — PHYSICAL EXAM
[Respiratory Effort] : normal respiratory effort [Normal Rate and Rhythm] : normal rate and rhythm [2+] : left 2+ [Ankle Swelling (On Exam)] : present [Ankle Swelling On The Left] : moderate [] : bilaterally [Ankle Swelling On The Right] : mild [Abdomen Tenderness] : ~T ~M No abdominal tenderness [Purpura] : no purpura  [Petechiae] : no petechiae [Skin Ulcer] : ulcer [Skin Induration] : no induration [Alert] : alert [Oriented to Person] : oriented to person [Oriented to Place] : oriented to place [Oriented to Time] : oriented to time [Calm] : calm [de-identified] : WN/WD, NAD [de-identified] : NC/AT [de-identified] : supple [FreeTextEntry1] : Ecchymoses noted in the LEs b/l [de-identified] : FROM throughout, strength 5/5x4 [de-identified] : L pretibial skin avulsion noted, measures 6x8cm w/medial aspect of the wound ischemic w/macerated skin, base of wound granulating but w/mild fibrinous exudate; erythema resolving in the leg, no longer involving the foot [de-identified] : grossly intact

## 2023-02-13 NOTE — HISTORY OF PRESENT ILLNESS
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  Pt has a PMHx of Afib (on Eliquis), HLD, depression, COPD, Amiodarone toxicity, scoliosis, osteoporosis, RSV, DVT >20 yrs ago.\par \par Pt has been cleaning the wound daily w/chlorhexidine, applying triple Abx ointment, and covering w/Xeroform then gauze/ACE.  As per pt, leg is still swollen but redness and pain have significantly improved.

## 2023-02-13 NOTE — PROCEDURE
[FreeTextEntry1] : Wound cleansed today w/peroxide and chlorhexidine, and dressed w/xeroform/dry gauze/ACE after moisturizing surrounding skin w/Vitamin A&D

## 2023-02-17 ENCOUNTER — APPOINTMENT (OUTPATIENT)
Dept: VASCULAR SURGERY | Facility: CLINIC | Age: 88
End: 2023-02-17
Payer: MEDICARE

## 2023-02-17 DIAGNOSIS — L97.919 CHRONIC VENOUS HYPERTENSION (IDIOPATHIC) WITH ULCER AND INFLAMMATION OF RIGHT LOWER EXTREMITY: ICD-10-CM

## 2023-02-17 DIAGNOSIS — I87.331 CHRONIC VENOUS HYPERTENSION (IDIOPATHIC) WITH ULCER AND INFLAMMATION OF RIGHT LOWER EXTREMITY: ICD-10-CM

## 2023-02-17 PROCEDURE — 99213 OFFICE O/P EST LOW 20 MIN: CPT

## 2023-02-21 PROBLEM — I87.331 CHRONIC VENOUS HYPERTENSION (IDIOPATHIC) WITH ULCER AND INFLAMMATION OF RIGHT LOWER EXTREMITY: Status: ACTIVE | Noted: 2022-12-05

## 2023-02-21 NOTE — ASSESSMENT
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  Pt has a PMHx of Afib (on Eliquis), HLD, depression, COPD, Amiodarone toxicity, scoliosis, osteoporosis, RSV, DVT >20 yrs ago.  Pt has been cleaning the wound daily w/chlorhexidine, applying triple Abx ointment, and covering w/Xeroform then gauze/ACE.  As per pt, leg is still swollen but redness and pain have significantly improved.\par \par Erythema resolving in the leg, no longer involving the foot, but swelling persistent.  L pretibial skin avulsion noted, measures 6x8cm w/medial aspect of the wound ischemic w/macerated skin, base of wound granulating but w/mild fibrinous exudate noted in the wound.  Wound cleansed today w/peroxide and chlorhexidine, and dressed w/xeroform/dry gauze/ACE after moisturizing surrounding skin w/Vitamin A&D.  Pt will RTO in 1wk [Arterial/Venous Disease] : arterial/venous disease [Ulcer Care] : ulcer care

## 2023-02-21 NOTE — PHYSICAL EXAM
[Respiratory Effort] : normal respiratory effort [Normal Rate and Rhythm] : normal rate and rhythm [2+] : left 2+ [Ankle Swelling (On Exam)] : present [Ankle Swelling On The Left] : moderate [] : bilaterally [Ankle Swelling On The Right] : mild [Abdomen Tenderness] : ~T ~M No abdominal tenderness [Purpura] : no purpura  [Petechiae] : no petechiae [Skin Ulcer] : ulcer [Skin Induration] : no induration [Alert] : alert [Oriented to Person] : oriented to person [Oriented to Place] : oriented to place [Oriented to Time] : oriented to time [Calm] : calm [de-identified] : WN/WD, NAD [de-identified] : NC/AT [de-identified] : supple [FreeTextEntry1] : Ecchymoses noted in the LEs b/l [de-identified] : FROM throughout, strength 5/5x4 [de-identified] : L pretibial skin avulsion noted, measures 6x8cm w/medial aspect of the wound ischemic w/macerated skin, base of wound granulating but w/mild fibrinous exudate; erythema resolving in the leg, no longer involving the foot [de-identified] : grossly intact

## 2023-02-23 ENCOUNTER — APPOINTMENT (OUTPATIENT)
Dept: VASCULAR SURGERY | Facility: CLINIC | Age: 88
End: 2023-02-23
Payer: MEDICARE

## 2023-02-23 PROCEDURE — 99213 OFFICE O/P EST LOW 20 MIN: CPT

## 2023-02-23 NOTE — ASSESSMENT
[Arterial/Venous Disease] : arterial/venous disease [Ulcer Care] : ulcer care [FreeTextEntry1] : 89 y/o F w/ LLE lower leg traumatic wound, healing with no signs of infection. Base moist with new islands of granulation tissue. Surrounding ecchymosis (almost resolved). Wound care provided in the office and pt to continue daily wound care at home. Leg elevation and ace bandage recommended to help decrease the swelling to improve wound healing. F/u week to monitor wound progress. \par \par

## 2023-02-23 NOTE — PROCEDURE
[FreeTextEntry1] : Wound cleansed today w/peroxide, and dressed w/xeroform/dry gauze/ACE after moisturizing surrounding skin w/Vitamin A&D

## 2023-02-23 NOTE — PHYSICAL EXAM
[Respiratory Effort] : normal respiratory effort [Normal Rate and Rhythm] : normal rate and rhythm [Ankle Swelling (On Exam)] : present [Ankle Swelling On The Left] : moderate [] : bilaterally [Ankle Swelling On The Right] : mild [Skin Ulcer] : ulcer [Alert] : alert [Oriented to Person] : oriented to person [Oriented to Place] : oriented to place [Oriented to Time] : oriented to time [Calm] : calm [Abdomen Tenderness] : ~T ~M No abdominal tenderness [Purpura] : no purpura  [Petechiae] : no petechiae [Skin Induration] : no induration [de-identified] : WN/WD, NAD [de-identified] : NC/AT [de-identified] : supple [FreeTextEntry1] : L pretibial skin avulsion noted, measures 4x6cm w/medial aspect of the wound w/macerated skin, base of wound fibrinous tissue with new islands of granulation tissue; ecchymosis and erythema resolving in the leg, no longer involving the foot [de-identified] : FROM throughout, strength 5/5x4 [de-identified] : See cardiovascular section  [de-identified] : grossly intact

## 2023-02-23 NOTE — HISTORY OF PRESENT ILLNESS
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  Pt has a PMHx of Afib (on Eliquis), HLD, depression, COPD, Amiodarone toxicity, scoliosis, osteoporosis, RSV, DVT >20 yrs ago.\par \par Pt has been cleaning the wound daily w/chlorhexidine, applying triple Abx ointment, and covering w/Xeroform then gauze/ACE.  As per pt, leg is still swollen but redness and pain have significantly improved. No fever or chills.

## 2023-03-01 ENCOUNTER — APPOINTMENT (OUTPATIENT)
Dept: VASCULAR SURGERY | Facility: CLINIC | Age: 88
End: 2023-03-01
Payer: MEDICARE

## 2023-03-01 VITALS
DIASTOLIC BLOOD PRESSURE: 60 MMHG | WEIGHT: 130 LBS | SYSTOLIC BLOOD PRESSURE: 92 MMHG | HEART RATE: 69 BPM | BODY MASS INDEX: 24.55 KG/M2 | HEIGHT: 61 IN

## 2023-03-01 PROCEDURE — 99213 OFFICE O/P EST LOW 20 MIN: CPT

## 2023-03-07 NOTE — ASSESSMENT
[FreeTextEntry1] : 89 y/o F w/ LLE lower leg traumatic wound, healing with no signs of infection. Base moist with more new islands of granulation tissue. Surrounding ecchymosis (almost resolved). Wound care provided in the office and pt to continue daily wound care at home. Leg elevation and ace bandage recommended to help decrease the swelling to improve wound healing. F/u 1 week to monitor wound progress. \par \par  [Arterial/Venous Disease] : arterial/venous disease [Ulcer Care] : ulcer care

## 2023-03-07 NOTE — PHYSICAL EXAM
[Respiratory Effort] : normal respiratory effort [Normal Rate and Rhythm] : normal rate and rhythm [Ankle Swelling (On Exam)] : present [Ankle Swelling On The Left] : moderate [] : bilaterally [Ankle Swelling On The Right] : mild [Abdomen Tenderness] : ~T ~M No abdominal tenderness [Purpura] : no purpura  [Petechiae] : no petechiae [Skin Ulcer] : ulcer [Skin Induration] : no induration [Alert] : alert [Oriented to Person] : oriented to person [Oriented to Place] : oriented to place [Oriented to Time] : oriented to time [Calm] : calm [de-identified] : WN/WD, NAD [de-identified] : NC/AT [de-identified] : supple [FreeTextEntry1] : L pretibial skin avulsion noted, measures 4x6cm, base of wound fibrinous tissue with more new islands of granulation tissue. No erythema and no ecchymosis. Mild to moderate swelling, persistent. [de-identified] : FROM throughout, strength 5/5x4 [de-identified] : See cardiovascular section  [de-identified] : grossly intact

## 2023-03-07 NOTE — HISTORY OF PRESENT ILLNESS
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 the ulcer healed however, she sustained trauma to the left leg now and has a large wound. She returns for reevaluation of the wound.  Pt has a PMHx of Afib (on Eliquis), HLD, depression, COPD, Amiodarone toxicity, scoliosis, osteoporosis, RSV, DVT >20 yrs ago.\par \par Pt has been cleaning the wound daily w/chlorhexidine, applying triple Abx ointment, and covering w/Xeroform then gauze/ACE.  As per pt, leg is still swollen but redness and pain have significantly improved. No fever or chills.

## 2023-03-08 ENCOUNTER — APPOINTMENT (OUTPATIENT)
Dept: VASCULAR SURGERY | Facility: CLINIC | Age: 88
End: 2023-03-08
Payer: MEDICARE

## 2023-03-08 PROCEDURE — 99213 OFFICE O/P EST LOW 20 MIN: CPT

## 2023-03-08 NOTE — ASSESSMENT
[FreeTextEntry1] : 89 y/o F w/ LLE lower leg traumatic wound, healing with no signs of infection. Base moist with more new islands of granulation tissue. Wound care provided in the office and pt to continue daily wound care at home. Leg elevation and ace bandage recommended to help decrease the swelling to improve wound healing. F/u 1 week to monitor wound progress.  [Arterial/Venous Disease] : arterial/venous disease [Ulcer Care] : ulcer care

## 2023-03-08 NOTE — HISTORY OF PRESENT ILLNESS
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 the ulcer healed however, she sustained trauma to the left leg now and has a large wound. She returns for reevaluation of the wound.  Pt has a PMHx of Afib (on Eliquis), HLD, depression, COPD, Amiodarone toxicity, scoliosis, osteoporosis, RSV, DVT >20 yrs ago.\par \par Pt has been cleaning the wound daily w/chlorhexidine, applying triple Abx ointment, and covering w/Xeroform then gauze/ACE.  No fever or chills.

## 2023-03-08 NOTE — PHYSICAL EXAM
[Respiratory Effort] : normal respiratory effort [Normal Rate and Rhythm] : normal rate and rhythm [Ankle Swelling (On Exam)] : present [Ankle Swelling On The Left] : moderate [] : bilaterally [Ankle Swelling On The Right] : mild [Abdomen Tenderness] : ~T ~M No abdominal tenderness [Purpura] : no purpura  [Petechiae] : no petechiae [Skin Ulcer] : ulcer [Skin Induration] : no induration [Alert] : alert [Oriented to Person] : oriented to person [Oriented to Place] : oriented to place [Oriented to Time] : oriented to time [Calm] : calm [de-identified] : WN/WD, NAD [de-identified] : NC/AT [de-identified] : supple [FreeTextEntry1] : L pretibial skin avulsion noted, measures 3.5x5cm, base of wound fibrinous tissue with more new islands of granulation tissue. No erythema and no ecchymosis. Mild to moderate swelling, persistent. [de-identified] : FROM throughout, strength 5/5x4 [de-identified] : grossly intact [de-identified] : See cardiovascular section

## 2023-03-16 ENCOUNTER — APPOINTMENT (OUTPATIENT)
Dept: VASCULAR SURGERY | Facility: CLINIC | Age: 88
End: 2023-03-16
Payer: MEDICARE

## 2023-03-16 PROCEDURE — 99213 OFFICE O/P EST LOW 20 MIN: CPT

## 2023-03-17 NOTE — PHYSICAL EXAM
[Respiratory Effort] : normal respiratory effort [Normal Rate and Rhythm] : normal rate and rhythm [Ankle Swelling (On Exam)] : present [Ankle Swelling On The Left] : moderate [] : bilaterally [Abdomen Tenderness] : ~T ~M No abdominal tenderness [Ankle Swelling On The Right] : mild [Purpura] : no purpura  [Petechiae] : no petechiae [Skin Ulcer] : ulcer [Skin Induration] : no induration [Alert] : alert [Oriented to Person] : oriented to person [Oriented to Place] : oriented to place [Oriented to Time] : oriented to time [Calm] : calm [de-identified] : WN/WD, NAD [de-identified] : NC/AT [de-identified] : supple [FreeTextEntry1] : L pretibial skin avulsion noted, measures 3x4cm, base of wound scant fibrinous tissue with more new islands of granulation tissue, edges debrided in the office and new skin underneath. No erythema and no ecchymosis. Mild to moderate swelling, persistent. [de-identified] : FROM throughout, strength 5/5x4 [de-identified] : See cardiovascular section  [de-identified] : grossly intact

## 2023-03-17 NOTE — PROCEDURE
[FreeTextEntry1] : Wound cleansed today, slightly debrided and dressed w/xeroform/dry gauze/ACE after moisturizing surrounding skin w/Vitamin A&D

## 2023-03-17 NOTE — ASSESSMENT
[FreeTextEntry1] : 89 y/o F w/ LLE lower leg traumatic wound, healing with no signs of infection. Base moist with more new islands of granulation tissue, smaller. Wound care provided in the office and pt to continue daily wound care at home. Leg elevation and ace bandage recommended to help decrease the swelling to improve wound healing. F/u 1 week to monitor wound progress.  [Arterial/Venous Disease] : arterial/venous disease [Ulcer Care] : ulcer care

## 2023-03-23 ENCOUNTER — APPOINTMENT (OUTPATIENT)
Dept: VASCULAR SURGERY | Facility: CLINIC | Age: 88
End: 2023-03-23
Payer: MEDICARE

## 2023-03-23 PROCEDURE — 99212 OFFICE O/P EST SF 10 MIN: CPT

## 2023-03-24 NOTE — PHYSICAL EXAM
[Respiratory Effort] : normal respiratory effort [Normal Rate and Rhythm] : normal rate and rhythm [Ankle Swelling (On Exam)] : present [Ankle Swelling On The Left] : moderate [] : bilaterally [Ankle Swelling On The Right] : mild [Skin Ulcer] : ulcer [Alert] : alert [Oriented to Person] : oriented to person [Oriented to Place] : oriented to place [Oriented to Time] : oriented to time [Calm] : calm [Abdomen Tenderness] : ~T ~M No abdominal tenderness [Purpura] : no purpura  [Petechiae] : no petechiae [Skin Induration] : no induration [de-identified] : WN/WD, NAD [de-identified] : NC/AT [de-identified] : supple [FreeTextEntry1] : L pretibial skin avulsion noted, measures 2x3cm, base of wound scant fibrinous tissue with more new islands of granulation tissue, edges debrided in the office and new skin underneath. No erythema and no ecchymosis. Mild to moderate swelling, persistent. [de-identified] : FROM throughout, strength 5/5x4 [de-identified] : See cardiovascular section  [de-identified] : grossly intact

## 2023-03-24 NOTE — ASSESSMENT
[Arterial/Venous Disease] : arterial/venous disease [Ulcer Care] : ulcer care [FreeTextEntry1] : 89 y/o F w/ LLE lower leg traumatic wound, healing with no signs of infection. Base moist with more new islands of granulation tissue, smaller. Wound care provided in the office and pt to continue daily wound care at home. Leg elevation and ace bandage recommended to help decrease the swelling to improve wound healing. F/u 1 week to monitor wound progress.

## 2023-03-30 ENCOUNTER — APPOINTMENT (OUTPATIENT)
Dept: VASCULAR SURGERY | Facility: CLINIC | Age: 88
End: 2023-03-30
Payer: MEDICARE

## 2023-03-30 PROCEDURE — 99212 OFFICE O/P EST SF 10 MIN: CPT

## 2023-04-03 NOTE — ASSESSMENT
[FreeTextEntry1] : 89 y/o F w/ LLE lower leg traumatic wound, healing with no signs of infection. Base moist with more new islands of granulation tissue, smaller. Wound care provided in the office and pt to continue daily wound care at home. Leg elevation and ace bandage recommended to help decrease the swelling to improve wound healing. F/u 1 week to monitor wound progress.  [Arterial/Venous Disease] : arterial/venous disease [Ulcer Care] : ulcer care

## 2023-04-03 NOTE — PHYSICAL EXAM
[Respiratory Effort] : normal respiratory effort [Normal Rate and Rhythm] : normal rate and rhythm [Ankle Swelling (On Exam)] : present [Ankle Swelling On The Left] : moderate [] : bilaterally [Ankle Swelling On The Right] : mild [Abdomen Tenderness] : ~T ~M No abdominal tenderness [Purpura] : no purpura  [Petechiae] : no petechiae [Skin Ulcer] : ulcer [Skin Induration] : no induration [Alert] : alert [Oriented to Person] : oriented to person [Oriented to Place] : oriented to place [Oriented to Time] : oriented to time [Calm] : calm [de-identified] : WN/WD, NAD [de-identified] : NC/AT [de-identified] : supple [FreeTextEntry1] : L pretibial skin avulsion noted, measures 1x2cm, base of wound scant fibrinous tissue with more new islands of granulation tissue, edges debrided in the office and new skin underneath. No erythema and no ecchymosis. Mild to moderate swelling, persistent. [de-identified] : FROM throughout, strength 5/5x4 [de-identified] : See cardiovascular section  [de-identified] : grossly intact

## 2023-04-06 ENCOUNTER — APPOINTMENT (OUTPATIENT)
Dept: VASCULAR SURGERY | Facility: CLINIC | Age: 88
End: 2023-04-06
Payer: MEDICARE

## 2023-04-06 PROCEDURE — 99212 OFFICE O/P EST SF 10 MIN: CPT

## 2023-04-06 NOTE — ASSESSMENT
[FreeTextEntry1] : 89 y/o F w/ LLE lower leg traumatic wound, healing with no signs of infection. Granulation tissue, smaller and almost completely healed. Wound care provided in the office and pt to continue daily wound care at home. Leg elevation and ace bandage recommended to help decrease the swelling to improve wound healing. F/u 1 week to monitor wound progress.

## 2023-04-06 NOTE — PHYSICAL EXAM
[Abdomen Tenderness] : ~T ~M No abdominal tenderness [Purpura] : no purpura  [Petechiae] : no petechiae [Skin Induration] : no induration [de-identified] : WN/WD, NAD [de-identified] : NC/AT [de-identified] : supple [FreeTextEntry1] : L pretibial skin avulsion noted, almost completely healed, only 3 small areas <0.5cm open, superficial, base of wound scant fibrinous tissue with more new islands of granulation tissue, edges debrided in the office and new skin underneath. No erythema and no ecchymosis. Mild to moderate swelling, persistent. [de-identified] : FROM throughout, strength 5/5x4 [de-identified] : See cardiovascular section  [de-identified] : grossly intact

## 2023-04-24 ENCOUNTER — APPOINTMENT (OUTPATIENT)
Dept: VASCULAR SURGERY | Facility: CLINIC | Age: 88
End: 2023-04-24
Payer: MEDICARE

## 2023-04-24 PROCEDURE — 99213 OFFICE O/P EST LOW 20 MIN: CPT

## 2023-04-25 ENCOUNTER — NON-APPOINTMENT (OUTPATIENT)
Age: 88
End: 2023-04-25

## 2023-04-26 NOTE — PHYSICAL EXAM
[Respiratory Effort] : normal respiratory effort [Normal Rate and Rhythm] : normal rate and rhythm [Ankle Swelling (On Exam)] : present [Ankle Swelling Bilaterally] : bilaterally  [Ankle Swelling On The Left] : moderate [] : bilaterally [Ankle Swelling On The Right] : mild [Abdomen Tenderness] : ~T ~M No abdominal tenderness [Purpura] : no purpura  [Petechiae] : no petechiae [Skin Ulcer] : ulcer [Alert] : alert [Skin Induration] : no induration [Oriented to Person] : oriented to person [Oriented to Place] : oriented to place [Oriented to Time] : oriented to time [Calm] : calm [de-identified] : WN/WD [FreeTextEntry1] : Overall skin very frail and thin. LLE no wounds. RLE superficial skin avulsion, granulation tissue on base, clean, distal edge with rolled excess skin, edges irregular, no erythema and no signs of infection. Mild to moderate swelling, persistent but mainly from below knee to foot. \par RUE posterior forearm with two small skin tears, no signs of infection, clean. [de-identified] : supple [de-identified] : FROM throughout, strength 5/5x4 [de-identified] : See cardiovascular section  [de-identified] : grossly intact

## 2023-04-26 NOTE — HISTORY OF PRESENT ILLNESS
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 The right leg ulcer healed however, she sustained trauma to the left leg and had developed a large wound. She was last seen 3/30 and the left leg wound also healed however, she tripped on the side walk and has a wound on the right knee area. Pt has a PMHx of Afib (on Eliquis), HLD, depression, COPD, Amiodarone toxicity, scoliosis, osteoporosis, RSV, DVT >20 yrs ago.\par \par Pt has been cleaning the wound daily and applying triple Abx ointment, and covering w/Xeroform then gauze/ACE.  No fever or chills. She also point out a couple of wound on the right arm as she used the right arm to brace herself when she tripped.

## 2023-04-26 NOTE — ASSESSMENT
[FreeTextEntry1] : 89 y/o F w/ new RUE and RLE lower leg traumatic wound after tripping on side walk , no signs of infection. Wound care provided in the office and pt to continue daily wound care at home. Daily use of compression stockings and moisturizing the skin daily around the wounds. F/u at the end of the week to monitor wound progress.  [Arterial/Venous Disease] : arterial/venous disease [Ulcer Care] : ulcer care

## 2023-04-27 ENCOUNTER — APPOINTMENT (OUTPATIENT)
Dept: GERIATRICS | Facility: CLINIC | Age: 88
End: 2023-04-27

## 2023-04-28 ENCOUNTER — APPOINTMENT (OUTPATIENT)
Dept: VASCULAR SURGERY | Facility: CLINIC | Age: 88
End: 2023-04-28
Payer: MEDICARE

## 2023-04-28 PROCEDURE — 99213 OFFICE O/P EST LOW 20 MIN: CPT

## 2023-05-01 ENCOUNTER — APPOINTMENT (OUTPATIENT)
Dept: VASCULAR SURGERY | Facility: CLINIC | Age: 88
End: 2023-05-01
Payer: MEDICARE

## 2023-05-01 DIAGNOSIS — S81.809A UNSPECIFIED OPEN WOUND, UNSPECIFIED LOWER LEG, INITIAL ENCOUNTER: ICD-10-CM

## 2023-05-01 DIAGNOSIS — L03.90 CELLULITIS, UNSPECIFIED: ICD-10-CM

## 2023-05-01 PROCEDURE — 99213 OFFICE O/P EST LOW 20 MIN: CPT

## 2023-05-01 NOTE — HISTORY OF PRESENT ILLNESS
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 She presented 4/24 with a new right leg after she tripped on the side walk and has a couple of skin tears on the right arm too. She has been cleaning the wound daily w/ peroxide, applying triple Abx ointment, and covering w/Xeroform then gauze/ACE. Today, she presents for followup and wound check/care. She reports on Wednesday, she got hurt with a shopping cart on the left leg. The wound was very deep and she had to got to an urgent care. The wound was stitched up by a PA at the  and she mentions they also used glue to close it up. She has been cleaning it daily as well. Denies any fever or chills.

## 2023-05-01 NOTE — PROCEDURE
[FreeTextEntry1] : RLE: Wound cleansed, slightly debrided and dressed w/ triple antibx ointment, xeroform/dry gauze after moisturizing surrounding skin w/Vitamin A&D.\par LLE: Wound cleansed, dressed w/ non adherent dressing, moisturizing surrounding skin w/Vitamin A&D.

## 2023-05-01 NOTE — PHYSICAL EXAM
[Respiratory Effort] : normal respiratory effort [Normal Rate and Rhythm] : normal rate and rhythm [Ankle Swelling (On Exam)] : present [Ankle Swelling Bilaterally] : bilaterally  [Ankle Swelling On The Left] : moderate [] : bilaterally [Ankle Swelling On The Right] : mild [Abdomen Tenderness] : ~T ~M No abdominal tenderness [Purpura] : no purpura  [Petechiae] : no petechiae [Skin Ulcer] : ulcer [Skin Induration] : no induration [Alert] : alert [Oriented to Person] : oriented to person [Oriented to Place] : oriented to place [Oriented to Time] : oriented to time [Calm] : calm [de-identified] : WN/WD [de-identified] : supple [FreeTextEntry1] : Overall skin very frail and thin. RLE knee w/ superficial skin avulsion, granulation tissue on base, clean, distal edge with rolled excess skin, edges irregular, new mild erythema around the wound. LLE lateral wound with stitches in place and steri-strips distally. Bilt mild to moderate swelling, persistent but mainly from below knee to foot. RUE posterior forearm with two small skin tears, no signs of infection, clean, smaller. [de-identified] : FROM throughout, strength 5/5x4 [de-identified] : See cardiovascular section  [de-identified] : grossly intact

## 2023-05-01 NOTE — ASSESSMENT
[FreeTextEntry1] : 89 y/o F w/ RUE and RLE lower leg traumatic wounds after tripping on side walk and new LLE traumatic wound s/p stitching/glue at  on 4/26.\par Wound care provided in the office and pt to continue daily wound care at home. Daily use of compression stockings and moisturizing the skin daily around the wounds. RLE wound with new mild erythema. Keflex for 7 days started also for new wound on the LLE although appears clean, wound has been stitched up and glued and appears it was deep. F/u Tuesday or sooner if needed.  [Arterial/Venous Disease] : arterial/venous disease [Medication Management] : medication management [Ulcer Care] : ulcer care

## 2023-05-05 ENCOUNTER — APPOINTMENT (OUTPATIENT)
Dept: VASCULAR SURGERY | Facility: CLINIC | Age: 88
End: 2023-05-05
Payer: MEDICARE

## 2023-05-05 PROCEDURE — 99213 OFFICE O/P EST LOW 20 MIN: CPT

## 2023-05-08 ENCOUNTER — APPOINTMENT (OUTPATIENT)
Dept: VASCULAR SURGERY | Facility: CLINIC | Age: 88
End: 2023-05-08
Payer: MEDICARE

## 2023-05-08 PROCEDURE — 99213 OFFICE O/P EST LOW 20 MIN: CPT

## 2023-05-09 NOTE — ASSESSMENT
[FreeTextEntry1] : 89 y/o F w/ RUE and RLE lower leg traumatic wounds after tripping on side walk and LLE traumatic wound s/p stitching/glue at  on 4/26. Both with persistent/new erythema around wounds despite Keflex.\par Wound care provided in the office and pt to continue daily wound care at home. Daily use of compression stockings and moisturizing the skin daily around the wounds. Will switch Keflex to Bactrim.  F/u Fri or sooner if needed.

## 2023-05-09 NOTE — PHYSICAL EXAM
[Ankle Swelling Bilaterally] : severe [Abdomen Tenderness] : ~T ~M No abdominal tenderness [Purpura] : no purpura  [Petechiae] : no petechiae [Skin Induration] : no induration [de-identified] : WN/WD [FreeTextEntry1] : Overall skin very frail and thin. RLE knee w/ superficial skin avulsion, granulation tissue on base, clean, distal edge with rolled excess skin, edges irregular, persistent mild erythema around the wound. LLE lateral wound with stitches in place and steri-strips distally, new mild erythema, scant serosang drainage. Bilt moderate to severe swelling, persistent but mainly from below knee to foot. RUE posterior forearm with one small skin tears, no signs of infection, clean, smaller. [de-identified] : See cardiovascular section  [de-identified] : FROM throughout, strength 5/5x4 [de-identified] : grossly intact

## 2023-05-09 NOTE — HISTORY OF PRESENT ILLNESS
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 She presented 4/24 with a new right leg after she tripped on the side walk and has a couple of skin tears on the right arm too. During her f/u appt on 4/28, she presented with another wound now to the LLE s/p suturing at . She has been cleaning the wounds daily w/ peroxide, applying triple Abx ointment, and covering w/Xeroform then gauze/ACE. \par \par Today, she presents for followup and wound check/care. She has been taking the Keflex as prescribed however, she fees the wounds have not improved much and they are sensitive. Denies any fever or chills. She tried to elevate the legs as much as possible during the weekend and had taken extra diuretic but her legs are swollen.

## 2023-05-12 ENCOUNTER — APPOINTMENT (OUTPATIENT)
Dept: VASCULAR SURGERY | Facility: CLINIC | Age: 88
End: 2023-05-12
Payer: MEDICARE

## 2023-05-12 PROCEDURE — 99213 OFFICE O/P EST LOW 20 MIN: CPT

## 2023-05-15 ENCOUNTER — APPOINTMENT (OUTPATIENT)
Dept: VASCULAR SURGERY | Facility: CLINIC | Age: 88
End: 2023-05-15
Payer: MEDICARE

## 2023-05-15 DIAGNOSIS — S81.811A LACERATION W/OUT FOREIGN BODY, RIGHT LOWER LEG, INITIAL ENCOUNTER: ICD-10-CM

## 2023-05-15 PROCEDURE — 99213 OFFICE O/P EST LOW 20 MIN: CPT

## 2023-05-15 NOTE — PHYSICAL EXAM
[Respiratory Effort] : normal respiratory effort [Normal Rate and Rhythm] : normal rate and rhythm [Ankle Swelling (On Exam)] : present [Ankle Swelling Bilaterally] : bilaterally  [Ankle Swelling On The Left] : moderate [] : bilaterally [Ankle Swelling On The Right] : mild [Abdomen Tenderness] : ~T ~M No abdominal tenderness [Purpura] : no purpura  [Petechiae] : no petechiae [Skin Ulcer] : ulcer [Skin Induration] : no induration [Alert] : alert [Oriented to Person] : oriented to person [Oriented to Place] : oriented to place [Oriented to Time] : oriented to time [Calm] : calm [de-identified] : WN/WD [FreeTextEntry1] : Overall skin very frail and thin. RLE knee w/superficial skin avulsion, 100% granulation tissue on base, clean, distal edges. LLE lateral wound w/stitches in place, small eschar noted at mid-wound. [de-identified] : FROM throughout, strength 5/5x4 [de-identified] : See cardiovascular section  [de-identified] : grossly intact

## 2023-05-15 NOTE — ASSESSMENT
[FreeTextEntry1] : 89 y/o F w/ RUE and RLE lower leg traumatic wounds after tripping on side walk and LLE traumatic wound s/p stitching/glue at  on 4/26.  Wounds nearly fully healed, and tension on the sutures of the LLE incision relieved; no evidence of infection in either leg.  Will continue current wound care regimen x1wk and likely remove sutures next week in office. [Arterial/Venous Disease] : arterial/venous disease [Medication Management] : medication management [Ulcer Care] : ulcer care

## 2023-05-15 NOTE — PROCEDURE
[FreeTextEntry1] : RLE: Wound cleansed w/chlorhexidine and dressed w/triple antibx ointment, xeroform/dry gauze after moisturizing surrounding skin w/Vitamin A&D.\par LLE: Wound cleansed w/chlorhexidine and dressed w/ non adherent dressing, moisturizing surrounding skin w/Vitamin A&D.

## 2023-05-22 ENCOUNTER — APPOINTMENT (OUTPATIENT)
Dept: VASCULAR SURGERY | Facility: CLINIC | Age: 88
End: 2023-05-22
Payer: MEDICARE

## 2023-05-22 DIAGNOSIS — R60.9 EDEMA, UNSPECIFIED: ICD-10-CM

## 2023-05-22 DIAGNOSIS — S81.801A UNSPECIFIED OPEN WOUND, RIGHT LOWER LEG, INITIAL ENCOUNTER: ICD-10-CM

## 2023-05-22 PROCEDURE — 99213 OFFICE O/P EST LOW 20 MIN: CPT

## 2023-05-22 NOTE — PHYSICAL EXAM
[Respiratory Effort] : normal respiratory effort [Normal Rate and Rhythm] : normal rate and rhythm [Ankle Swelling (On Exam)] : present [Ankle Swelling Bilaterally] : severe [] : bilaterally [Ankle Swelling On The Right] : mild [Skin Ulcer] : ulcer [Alert] : alert [Oriented to Person] : oriented to person [Oriented to Place] : oriented to place [Oriented to Time] : oriented to time [Calm] : calm [Abdomen Tenderness] : ~T ~M No abdominal tenderness [Purpura] : no purpura  [Petechiae] : no petechiae [Skin Induration] : no induration [de-identified] : WN/WD [FreeTextEntry1] : Overall skin very frail and thin. RLE knee w/ superficial skin avulsion, granulation tissue on base, clean, distal edge with rolled excess skin, edges irregular, persistent mild erythema around the wound. LLE lateral wound with stitches in place and steri-strips distally, new mild erythema, scant serosang drainage. Bilt moderate to severe swelling, persistent but mainly from below knee to foot. RUE posterior forearm with one small skin tears, no signs of infection, clean, smaller. [de-identified] : FROM throughout, strength 5/5x4 [de-identified] : See cardiovascular section  [de-identified] : grossly intact

## 2023-05-22 NOTE — HISTORY OF PRESENT ILLNESS
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 She presented 4/24 with a new right leg after she tripped on the side walk and has a couple of skin tears on the right arm too. During her f/u appt on 4/28, she presented with another wound now to the LLE s/p suturing at . She has been cleaning the wounds daily w/ peroxide, applying triple Abx ointment, and covering w/Xeroform then gauze/ACE. \par \par Today, she presents for followup and wound check/care. She has been taking the Bactrim as prescribed and thinks the wounds are looking better. She also elevated the legs a lot since she was last here. Denies any fever or chills.

## 2023-05-22 NOTE — ASSESSMENT
[Arterial/Venous Disease] : arterial/venous disease [Medication Management] : medication management [Ulcer Care] : ulcer care [FreeTextEntry1] : 89 y/o F w/ RUE and RLE lower leg traumatic wounds after tripping on side walk and LLE traumatic wound s/p stitching/glue at  on 4/26. Both with improvement after Bactrim DS prescribed last week. Legs still edematous but not worsened.\par Wound care provided in the office and pt to continue daily wound care at home. Daily use of compression stockings or ACE bandages and moisturizing the skin daily around the wounds. No more antibiotic.  F/u Mon.

## 2023-05-23 PROBLEM — S81.801A TRAUMATIC OPEN WOUND OF RIGHT LOWER LEG: Status: ACTIVE | Noted: 2023-04-28

## 2023-05-23 LAB
ALBUMIN SERPL ELPH-MCNC: 4 G/DL
ALP BLD-CCNC: 79 U/L
ALT SERPL-CCNC: 19 U/L
ANION GAP SERPL CALC-SCNC: 14 MMOL/L
AST SERPL-CCNC: 24 U/L
BILIRUB SERPL-MCNC: 0.5 MG/DL
BUN SERPL-MCNC: 51 MG/DL
CALCIUM SERPL-MCNC: 9.5 MG/DL
CHLORIDE SERPL-SCNC: 103 MMOL/L
CO2 SERPL-SCNC: 25 MMOL/L
CREAT SERPL-MCNC: 1.63 MG/DL
EGFR: 30 ML/MIN/1.73M2
GLUCOSE SERPL-MCNC: 94 MG/DL
POTASSIUM SERPL-SCNC: 4.5 MMOL/L
PROT SERPL-MCNC: 6.1 G/DL
SODIUM SERPL-SCNC: 142 MMOL/L

## 2023-05-23 NOTE — PHYSICAL EXAM
[Respiratory Effort] : normal respiratory effort [Normal Rate and Rhythm] : normal rate and rhythm [Ankle Swelling (On Exam)] : present [Ankle Swelling Bilaterally] : severe [] : bilaterally [Ankle Swelling On The Right] : mild [Abdomen Tenderness] : ~T ~M No abdominal tenderness [Purpura] : no purpura  [Petechiae] : no petechiae [Skin Ulcer] : ulcer [Skin Induration] : no induration [Alert] : alert [Oriented to Person] : oriented to person [Oriented to Place] : oriented to place [Oriented to Time] : oriented to time [Calm] : calm [de-identified] : WN/WD [FreeTextEntry1] : Overall skin very frail and thin. RLE knee w/ superficial skin avulsion, granulation tissue on base, clean, distal edge with rolled excess skin, edges irregular, no erythema around the wound. LLE lateral wound with stitches in place, no erythema, scant serosang drainage. Bilt moderate to severe swelling, persistent but mainly from below knee to foot. RUE posterior forearm with one small skin tears, no signs of infection, clean, smaller. [de-identified] : FROM throughout, strength 5/5x4 [de-identified] : See cardiovascular section  [de-identified] : grossly intact

## 2023-05-23 NOTE — PHYSICAL EXAM
[Respiratory Effort] : normal respiratory effort [Normal Rate and Rhythm] : normal rate and rhythm [Ankle Swelling (On Exam)] : present [Ankle Swelling Bilaterally] : severe [] : bilaterally [Ankle Swelling On The Right] : mild [Skin Ulcer] : ulcer [Alert] : alert [Oriented to Person] : oriented to person [Oriented to Place] : oriented to place [Oriented to Time] : oriented to time [Calm] : calm [Abdomen Tenderness] : ~T ~M No abdominal tenderness [Purpura] : no purpura  [Petechiae] : no petechiae [Skin Induration] : no induration [de-identified] : WN/WD [FreeTextEntry1] : Overall skin very frail and thin. RLE knee w/ superficial skin avulsion, granulation tissue on base, clean, edges irregular, no erythema around the wound, smaller than last visit. LLE lateral wound with stitches in place, no erythema, scant serosang drainage, center of wound with some fibrinous issue. Bilt moderate to severe swelling, persistent but mainly from below knee to foot. RUE posterior forearm healed. [de-identified] : FROM throughout, strength 5/5x4 [de-identified] : See cardiovascular section  [de-identified] : grossly intact

## 2023-05-23 NOTE — PROCEDURE
[FreeTextEntry1] : RLE: moisturizing skin w/Vitamin A&D.\par LLE: wound cleansed w/peroxide/betadine and dressed w/ kerlix, moisturizing surrounding skin and ace badnage

## 2023-05-23 NOTE — HISTORY OF PRESENT ILLNESS
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 She presented 4/24 with a new right leg after she tripped on the side walk and has a couple of skin tears on the right arm too. During her f/u appt on 4/28, she presented with another wound now to the LLE s/p suturing at . She has been cleaning the wounds daily w/ peroxide, applying triple Abx ointment, and covering w/Xeroform then gauze/ACE. \par \par Today, she presents for followup and wound check/care. She also has been elevating the legs and took extra diuretics this week. Denies any fever or chills. She tried to elevate the legs as much as possible during the weekend and had taken extra diuretic but her legs are swollen.

## 2023-05-23 NOTE — ASSESSMENT
[FreeTextEntry1] : 89 y/o F w/ RUE and RLE lower leg traumatic wounds after tripping on side walk and LLE traumatic wound s/p stitching/glue at  on 4/26. Both with improvement. Legs still edematous but not worsened.\par Wound care provided in the office and pt to continue daily wound care at home. Daily use of compression stockings or ACE bandages and moisturizing the skin daily around the wounds. No more antibiotic. F/u Fri and will consider removing the sutures. [Arterial/Venous Disease] : arterial/venous disease [Medication Management] : medication management [Ulcer Care] : ulcer care

## 2023-05-23 NOTE — HISTORY OF PRESENT ILLNESS
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 She presented 4/24 with a new right leg after she tripped on the side walk and has a couple of skin tears on the right arm too. During her f/u appt on 4/28, she presented with another wound now to the LLE s/p suturing at . She has been cleaning the wounds daily w/ peroxide, applying triple Abx ointment, and covering w/Xeroform then gauze/ACE. \par \par Today, she presents for followup and wound check/care. She also has been elevating the legs and took extra diuretics this week. Denies any fever or chills.

## 2023-05-23 NOTE — HISTORY OF PRESENT ILLNESS
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 She presented 4/24 with a new right leg after she tripped on the side walk and has a couple of skin tears on the right arm too. During her f/u appt on 4/28, she presented with another wound now to the LLE s/p suturing at . She has been cleaning the wounds daily w/ peroxide, applying triple Abx ointment, and covering w/Xeroform then gauze/ACE. \par \par Today, she presents for followup and wound check/care. She also elevated the legs a lot since she was last here. Denies any fever or chills.

## 2023-05-23 NOTE — ASSESSMENT
[Arterial/Venous Disease] : arterial/venous disease [Medication Management] : medication management [Ulcer Care] : ulcer care [FreeTextEntry1] : 89 y/o F w/ RUE and RLE lower leg traumatic wounds after tripping on side walk and LLE traumatic wound s/p stitching/glue at  on 4/26.  Wounds nearly fully healed, and tension on the sutures of the LLE incision relieved; no evidence of infection in either leg.  Will continue current wound care regimen x1wk. Check electrolytes given extra diuretics. Will likely remove sutures next week in office.

## 2023-05-23 NOTE — ASSESSMENT
[Arterial/Venous Disease] : arterial/venous disease [Medication Management] : medication management [Ulcer Care] : ulcer care [FreeTextEntry1] : 89 y/o F w/ RUE and RLE lower leg traumatic wounds after tripping on side walk and LLE traumatic wound s/p stitching/glue at  on 4/26. Both with improvement. Legs still edematous but not worsened.\par Wound care provided in the office and pt to continue daily wound care at home. Daily use of compression stockings or ACE bandages and moisturizing the skin daily around the wounds. No more antibiotic. F/u Mon and will consider removing the sutures.

## 2023-05-30 ENCOUNTER — APPOINTMENT (OUTPATIENT)
Dept: VASCULAR SURGERY | Facility: CLINIC | Age: 88
End: 2023-05-30
Payer: MEDICARE

## 2023-05-30 PROCEDURE — 99213 OFFICE O/P EST LOW 20 MIN: CPT

## 2023-05-30 NOTE — PHYSICAL EXAM
[Respiratory Effort] : normal respiratory effort [Normal Rate and Rhythm] : normal rate and rhythm [Ankle Swelling (On Exam)] : present [Ankle Swelling Bilaterally] : bilaterally  [Ankle Swelling On The Left] : moderate [] : bilaterally [Ankle Swelling On The Right] : mild [Abdomen Tenderness] : ~T ~M No abdominal tenderness [Purpura] : no purpura  [Petechiae] : no petechiae [Skin Ulcer] : ulcer [Skin Induration] : no induration [Alert] : alert [Oriented to Person] : oriented to person [Oriented to Place] : oriented to place [Oriented to Time] : oriented to time [Calm] : calm [de-identified] : WN/WD [FreeTextEntry1] : Overall skin very frail and thin. RLE knee w/superficial skin avulsion healed. LLE lateral wound w/stitches in place, small eschar noted at mid-wound, no erythema, no drainage, improving. Bilt mild to moderate swelling, persistent but mainly from below knee to foot.  [de-identified] : FROM throughout, strength 5/5x4 [de-identified] : See cardiovascular section  [de-identified] : grossly intact

## 2023-05-30 NOTE — HISTORY OF PRESENT ILLNESS
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 She presented 4/24 with a new right leg after she tripped on the side walk and has a couple of skin tears on the right arm too. During her f/u appt on 4/28, she presented with another wound now to the LLE s/p suturing at . She has been cleaning the wounds daily w/ peroxide, applying triple Abx ointment, and covering w/Xeroform then gauze/ACE. \par \par Today, she presents for followup and wound check/care. She also has been elevating the legs. Denies any fever or chills.

## 2023-05-30 NOTE — ASSESSMENT
[FreeTextEntry1] : 89 y/o F w/ RUE and RLE lower leg traumatic wounds after tripping on side walk and LLE traumatic wound s/p stitching/glue at  on 4/26.  Wounds nearly fully healed, and tension on the sutures of the LLE incision relieved; no evidence of infection in either leg. 2 sutures removed today. Will continue current wound care regimen and f/u at the end of the week [Arterial/Venous Disease] : arterial/venous disease [Medication Management] : medication management [Ulcer Care] : ulcer care

## 2023-05-30 NOTE — PROCEDURE
[FreeTextEntry1] : LLE: wound cleansed w/peroxide/betadine and dressed w/ kerlix, moisturizing surrounding skin. 2 sutures removed today. 2 remaining.

## 2023-06-02 ENCOUNTER — APPOINTMENT (OUTPATIENT)
Dept: VASCULAR SURGERY | Facility: CLINIC | Age: 88
End: 2023-06-02
Payer: MEDICARE

## 2023-06-02 PROCEDURE — 99213 OFFICE O/P EST LOW 20 MIN: CPT

## 2023-06-05 NOTE — ASSESSMENT
[FreeTextEntry1] : 89 y/o F w/ RUE and RLE lower leg traumatic wounds after tripping on side walk and LLE traumatic wound s/p stitching/glue at  on 4/26.  Wounds nearly fully healed, and last tension on the sutures of the LLEremoved; no evidence of infection. Will continue current wound care regimen and f/u in 1 week [Arterial/Venous Disease] : arterial/venous disease [Medication Management] : medication management [Ulcer Care] : ulcer care

## 2023-06-05 NOTE — PHYSICAL EXAM
[Respiratory Effort] : normal respiratory effort [Normal Rate and Rhythm] : normal rate and rhythm [Ankle Swelling (On Exam)] : present [Ankle Swelling Bilaterally] : bilaterally  [Ankle Swelling On The Left] : moderate [] : bilaterally [Ankle Swelling On The Right] : mild [Abdomen Tenderness] : ~T ~M No abdominal tenderness [Purpura] : no purpura  [Petechiae] : no petechiae [Skin Ulcer] : ulcer [Skin Induration] : no induration [Alert] : alert [Oriented to Person] : oriented to person [Oriented to Place] : oriented to place [Oriented to Time] : oriented to time [Calm] : calm [de-identified] : WN/WD [FreeTextEntry1] : Overall skin very frail and thin. RLE knee w/superficial skin avulsion healed. LLE lateral wound w/stitches in place, small eschar noted at mid-wound, no erythema, no drainage, improving. Bilt mild to moderate swelling, persistent but mainly from below knee to foot.  [de-identified] : FROM throughout, strength 5/5x4 [de-identified] : See cardiovascular section  [de-identified] : grossly intact

## 2023-06-05 NOTE — HISTORY OF PRESENT ILLNESS
----- Message from Lisa Guy sent at 3/26/2021  9:37 AM CDT -----  Regarding: Cost PA Needed  Delfina Coleman and staff,    We attempted to run Ms. Mckee's Tr[employer] through her insurance for a three month supply and they don't want to cover it due to the cost exceeding their maximum amount and it needs a PA. If you could call to confirm that she does need the three month supply, that would allow us to dispense three months. Their phone number is [phone], her ID # is [id number], and her claim number is [id number].     Thanks,  Lisa    
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 She presented 4/24 with a new right leg after she tripped on the side walk and has a couple of skin tears on the right arm too. During her f/u appt on 4/28, she presented with another wound now to the LLE s/p suturing at . She has been cleaning the wounds daily w/ peroxide, applying triple Abx ointment, and covering w/Xeroform then gauze/ACE. \par \par Today, she presents for followup and wound check/care. She also has been elevating the legs. Denies any fever or chills.

## 2023-06-05 NOTE — PROCEDURE
[FreeTextEntry1] : LLE: wound cleansed w/peroxide/betadine and dressed w/ kerlix, moisturizing surrounding skin. 2 remaining sutures removed today.

## 2023-06-09 ENCOUNTER — APPOINTMENT (OUTPATIENT)
Dept: VASCULAR SURGERY | Facility: CLINIC | Age: 88
End: 2023-06-09
Payer: MEDICARE

## 2023-06-09 DIAGNOSIS — R60.0 LOCALIZED EDEMA: ICD-10-CM

## 2023-06-09 PROCEDURE — 99213 OFFICE O/P EST LOW 20 MIN: CPT

## 2023-06-09 NOTE — ASSESSMENT
[Arterial/Venous Disease] : arterial/venous disease [Medication Management] : medication management [Ulcer Care] : ulcer care [FreeTextEntry1] : 89 y/o F w/ RUE and RLE lower leg traumatic wounds after tripping on side walk and LLE traumatic wound s/p stitching/glue at  on 4/26.  LLE wound nearly fully healed, no evidence of infection but persistent edema. PT has appt with cardiologist at the end of the month. Will continue current wound care regimen and f/u in 1 week

## 2023-06-09 NOTE — PROCEDURE
[FreeTextEntry1] : LLE: wound cleansed w/peroxide/betadine, Xerofrom and dressed w/ kerlix, moisturizing surrounding skin.

## 2023-06-09 NOTE — PHYSICAL EXAM
[Respiratory Effort] : normal respiratory effort [Normal Rate and Rhythm] : normal rate and rhythm [Ankle Swelling (On Exam)] : present [Ankle Swelling Bilaterally] : bilaterally  [Ankle Swelling On The Left] : moderate [] : bilaterally [Ankle Swelling On The Right] : mild [Skin Ulcer] : ulcer [Alert] : alert [Oriented to Person] : oriented to person [Oriented to Place] : oriented to place [Oriented to Time] : oriented to time [Calm] : calm [Abdomen Tenderness] : ~T ~M No abdominal tenderness [Purpura] : no purpura  [Petechiae] : no petechiae [Skin Induration] : no induration [de-identified] : WN/WD [FreeTextEntry1] : Overall skin very frail and thin. RLE knee w/superficial skin avulsion healed. LLE lateral wound smaller with scab formation on proximal and distal edges, center wound base with granulation tissue and scant fibrinous tissue, no erythema, no drainage, improving. Bilt mild to moderate swelling, persistent but mainly from below knee to foot.  [de-identified] : FROM throughout, strength 5/5x4 [de-identified] : See cardiovascular section  [de-identified] : grossly intact

## 2023-06-09 NOTE — HISTORY OF PRESENT ILLNESS
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 She presented 4/24 with a new right leg after she tripped on the side walk and has a couple of skin tears on the right arm too. During her f/u appt on 4/28, she presented with another wound now to the LLE s/p suturing at . She has been cleaning the wounds daily w/ peroxide, applying triple Abx ointment, and covering w/Xeroform then gauze/ACE. \par \par Today, she presents for followup and wound check/care. She also has been elevating the legs but not as much and took an extra water pill but reports the legs are still swollen. She has been busy with family and plans to rest this wweekend. Denies any fever or chills.

## 2023-06-15 ENCOUNTER — APPOINTMENT (OUTPATIENT)
Dept: GERIATRICS | Facility: CLINIC | Age: 88
End: 2023-06-15
Payer: MEDICARE

## 2023-06-15 VITALS
HEART RATE: 73 BPM | OXYGEN SATURATION: 97 % | HEIGHT: 61 IN | SYSTOLIC BLOOD PRESSURE: 99 MMHG | BODY MASS INDEX: 25.17 KG/M2 | WEIGHT: 133.31 LBS | TEMPERATURE: 97.9 F | DIASTOLIC BLOOD PRESSURE: 63 MMHG | RESPIRATION RATE: 15 BRPM

## 2023-06-15 PROCEDURE — 99214 OFFICE O/P EST MOD 30 MIN: CPT

## 2023-06-15 PROCEDURE — G0444 DEPRESSION SCREEN ANNUAL: CPT

## 2023-06-15 RX ORDER — TORSEMIDE 10 MG/1
10 TABLET ORAL
Qty: 90 | Refills: 0 | Status: ACTIVE | COMMUNITY
Start: 2021-09-02

## 2023-06-15 RX ORDER — SULFAMETHOXAZOLE AND TRIMETHOPRIM 800; 160 MG/1; MG/1
800-160 TABLET ORAL
Qty: 14 | Refills: 0 | Status: COMPLETED | COMMUNITY
Start: 2023-02-10 | End: 2023-06-15

## 2023-06-15 RX ORDER — CEPHALEXIN 500 MG/1
500 CAPSULE ORAL
Qty: 10 | Refills: 0 | Status: COMPLETED | COMMUNITY
Start: 2023-04-28 | End: 2023-06-15

## 2023-06-15 RX ORDER — SULFAMETHOXAZOLE AND TRIMETHOPRIM 800; 160 MG/1; MG/1
800-160 TABLET ORAL TWICE DAILY
Qty: 14 | Refills: 0 | Status: COMPLETED | COMMUNITY
Start: 2023-05-01 | End: 2023-06-15

## 2023-06-16 ENCOUNTER — APPOINTMENT (OUTPATIENT)
Dept: VASCULAR SURGERY | Facility: CLINIC | Age: 88
End: 2023-06-16
Payer: MEDICARE

## 2023-06-16 DIAGNOSIS — S81.802A UNSPECIFIED OPEN WOUND, LEFT LOWER LEG, INITIAL ENCOUNTER: ICD-10-CM

## 2023-06-16 PROCEDURE — 99213 OFFICE O/P EST LOW 20 MIN: CPT

## 2023-06-16 NOTE — HISTORY OF PRESENT ILLNESS
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 She presented 4/24 with a new right leg after she tripped on the side walk and has a couple of skin tears on the right arm too. During her f/u appt on 4/28, she presented with another wound now to the LLE s/p suturing at . She has been cleaning the wounds daily w/ peroxide, applying triple Abx ointment, and covering w/Xeroform then gauze/ACE. \par \par Today, she presents for followup and wound check/care. She also has been elevating the legs and reports the swelling is relatively stable. Denies any fever or chills.

## 2023-06-16 NOTE — ASSESSMENT
[Arterial/Venous Disease] : arterial/venous disease [Medication Management] : medication management [Ulcer Care] : ulcer care [FreeTextEntry1] : 89 y/o F w/ RUE and RLE lower leg traumatic wounds after tripping on side walk and LLE traumatic wound s/p stitching/glue at  on 4/26.  LLE wound nearly fully healed, no evidence of infection but persistent edema. Will continue current wound care regimen and f/u in 2 weeks

## 2023-06-16 NOTE — PHYSICAL EXAM
[Respiratory Effort] : normal respiratory effort [Normal Rate and Rhythm] : normal rate and rhythm [Ankle Swelling (On Exam)] : present [Ankle Swelling Bilaterally] : bilaterally  [Ankle Swelling On The Left] : moderate [] : bilaterally [Ankle Swelling On The Right] : mild [Skin Ulcer] : ulcer [Alert] : alert [Oriented to Person] : oriented to person [Oriented to Place] : oriented to place [Oriented to Time] : oriented to time [Calm] : calm [Abdomen Tenderness] : ~T ~M No abdominal tenderness [Purpura] : no purpura  [Petechiae] : no petechiae [Skin Induration] : no induration [de-identified] : WN/WD [FreeTextEntry1] : Overall skin very frail and thin. LLE lateral wound smaller, superficial, granulation tissue over it, no erythema, no drainage, improving and a lot smaller. Bilt mild to moderate swelling, persistent but with some improvement [de-identified] : FROM throughout, strength 5/5x4 [de-identified] : See cardiovascular section  [de-identified] : grossly intact

## 2023-06-16 NOTE — PROCEDURE
[FreeTextEntry1] : LLE: wound cleansed w/peroxide/betadine, Xeroform and dressed w/ kerlix, moisturizing surrounding skin.

## 2023-06-22 NOTE — ASSESSMENT
[FreeTextEntry1] : 89 yo female with skin tear, chf, htn, afib, scoliosis,but improved pulmonary function\par \par No change in regimen\par Continue pulm rehab \par consider antidepressant. Pt declines. Spent long time >10 minutes discussing treatment options. Pt worried about  and how her life has become more limited.\par \par Will continue support. offered sw consultation too.  Declined. OVerall pt improved.

## 2023-06-22 NOTE — HISTORY OF PRESENT ILLNESS
[FreeTextEntry1] : 89 yo female here for followup. Pt has been dealing with skin tear on lower leg. She has been participating in pulmonary rehab and is much more functional.  Pt feels better. No new events. No sob, not using oxygen today.\par \par  [1] : 2) Feeling down, depressed, or hopeless for several days (1) [PHQ-2 Positive] : PHQ-2 Positive [I have developed a follow-up plan documented below in the note.] : I have developed a follow-up plan documented below in the note. [[id number]] : 2

## 2023-06-22 NOTE — REVIEW OF SYSTEMS
[Negative] : Heme/Lymph
1. I was told the name of the doctor(s) who took care of my child while in the hospital.    2. I have been told about any important findings on my child's plan of care.    3. The doctor clearly explained my child's diagnosis and other possible diagnoses that were considered.    4. My child's doctor explained all the tests that were done and their results (if available). I understand that some of the test results may not be ready before we go home and I was told how I can get these results. I understand that a summary of my child's hospitalization and important test results will be shared with my child's outpatient doctor.    5. My child's doctor talked to me about what I need to do when we go home.    6. I understand what signs and symptoms to watch for. I understand what symptoms I would need to call my doctor for and/or return to the hospital.    7. I have the phone number to call the hospital for results and/or questions after I leave the hospital.

## 2023-06-30 ENCOUNTER — APPOINTMENT (OUTPATIENT)
Dept: VASCULAR SURGERY | Facility: CLINIC | Age: 88
End: 2023-06-30
Payer: MEDICARE

## 2023-06-30 DIAGNOSIS — L97.912 NON-PRESSURE CHRONIC ULCER OF UNSPECIFIED PART OF RIGHT LOWER LEG WITH FAT LAYER EXPOSED: ICD-10-CM

## 2023-06-30 DIAGNOSIS — L97.919 VARICOSE VEINS OF RIGHT LOWER EXTREMITY WITH ULCER OF UNSPECIFIED SITE: ICD-10-CM

## 2023-06-30 DIAGNOSIS — I83.019 VARICOSE VEINS OF RIGHT LOWER EXTREMITY WITH ULCER OF UNSPECIFIED SITE: ICD-10-CM

## 2023-06-30 PROCEDURE — 99213 OFFICE O/P EST LOW 20 MIN: CPT

## 2023-06-30 NOTE — ASSESSMENT
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  Pt will RTO In 2wks IF wound is still not healed, but recommend she continue current wound care regimen.

## 2023-06-30 NOTE — PHYSICAL EXAM
[Respiratory Effort] : normal respiratory effort [Normal Rate and Rhythm] : normal rate and rhythm [Ankle Swelling (On Exam)] : present [Ankle Swelling Bilaterally] : bilaterally  [Ankle Swelling On The Left] : moderate [] : bilaterally [Ankle Swelling On The Right] : mild [Skin Ulcer] : ulcer [Alert] : alert [Oriented to Person] : oriented to person [Oriented to Place] : oriented to place [Oriented to Time] : oriented to time [Calm] : calm [Abdomen Tenderness] : ~T ~M No abdominal tenderness [Purpura] : no purpura  [Petechiae] : no petechiae [Skin Induration] : no induration [de-identified] : WN/WD [FreeTextEntry1] : Overall skin very frail and thin. LLE lateral wound smaller, superficial, granulation tissue over it, no erythema, no drainage, improving and a lot smaller. Bilt mild to moderate swelling, persistent but with some improvement [de-identified] : FROM throughout, strength 5/5x4 [de-identified] : See cardiovascular section  [de-identified] : grossly intact

## 2023-06-30 NOTE — HISTORY OF PRESENT ILLNESS
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 She also has been elevating the legs and reports the swelling is relatively stable. Denies any fever or chills.

## 2023-08-22 ENCOUNTER — APPOINTMENT (OUTPATIENT)
Dept: RADIOLOGY | Facility: IMAGING CENTER | Age: 88
End: 2023-08-22
Payer: MEDICARE

## 2023-08-22 ENCOUNTER — RESULT REVIEW (OUTPATIENT)
Age: 88
End: 2023-08-22

## 2023-08-22 ENCOUNTER — APPOINTMENT (OUTPATIENT)
Dept: GERIATRICS | Facility: CLINIC | Age: 88
End: 2023-08-22
Payer: MEDICARE

## 2023-08-22 ENCOUNTER — OUTPATIENT (OUTPATIENT)
Dept: OUTPATIENT SERVICES | Facility: HOSPITAL | Age: 88
LOS: 1 days | End: 2023-08-22
Payer: MEDICARE

## 2023-08-22 VITALS
TEMPERATURE: 97.3 F | OXYGEN SATURATION: 94 % | HEART RATE: 72 BPM | DIASTOLIC BLOOD PRESSURE: 59 MMHG | BODY MASS INDEX: 24.26 KG/M2 | SYSTOLIC BLOOD PRESSURE: 89 MMHG | RESPIRATION RATE: 16 BRPM | HEIGHT: 61 IN | WEIGHT: 128.5 LBS

## 2023-08-22 DIAGNOSIS — M54.50 LOW BACK PAIN, UNSPECIFIED: ICD-10-CM

## 2023-08-22 PROCEDURE — 71046 X-RAY EXAM CHEST 2 VIEWS: CPT

## 2023-08-22 PROCEDURE — 71046 X-RAY EXAM CHEST 2 VIEWS: CPT | Mod: 26

## 2023-08-22 PROCEDURE — 72070 X-RAY EXAM THORAC SPINE 2VWS: CPT | Mod: 26

## 2023-08-22 PROCEDURE — 72070 X-RAY EXAM THORAC SPINE 2VWS: CPT

## 2023-08-22 PROCEDURE — 99214 OFFICE O/P EST MOD 30 MIN: CPT

## 2023-08-22 PROCEDURE — 72110 X-RAY EXAM L-2 SPINE 4/>VWS: CPT | Mod: 26

## 2023-08-22 PROCEDURE — 72110 X-RAY EXAM L-2 SPINE 4/>VWS: CPT

## 2023-08-23 LAB
ALBUMIN SERPL ELPH-MCNC: 4.3 G/DL
ALP BLD-CCNC: 73 U/L
ALT SERPL-CCNC: 17 U/L
ANION GAP SERPL CALC-SCNC: 17 MMOL/L
APPEARANCE: CLEAR
APTT BLD: 29.2 SEC
AST SERPL-CCNC: 23 U/L
BASOPHILS # BLD AUTO: 0.03 K/UL
BASOPHILS NFR BLD AUTO: 0.4 %
BILIRUB SERPL-MCNC: 0.5 MG/DL
BILIRUBIN URINE: NEGATIVE
BLOOD URINE: NEGATIVE
BUN SERPL-MCNC: 72 MG/DL
CALCIUM SERPL-MCNC: 9.8 MG/DL
CHLORIDE SERPL-SCNC: 97 MMOL/L
CO2 SERPL-SCNC: 20 MMOL/L
COLOR: YELLOW
CREAT SERPL-MCNC: 2.87 MG/DL
EGFR: 15 ML/MIN/1.73M2
EOSINOPHIL # BLD AUTO: 0.09 K/UL
EOSINOPHIL NFR BLD AUTO: 1.3 %
GLUCOSE QUALITATIVE U: NEGATIVE MG/DL
GLUCOSE SERPL-MCNC: 107 MG/DL
HCT VFR BLD CALC: 44.2 %
HGB BLD-MCNC: 14.2 G/DL
IMM GRANULOCYTES NFR BLD AUTO: 0.4 %
INR PPP: 1.27 RATIO
KETONES URINE: NEGATIVE MG/DL
LEUKOCYTE ESTERASE URINE: NEGATIVE
LYMPHOCYTES # BLD AUTO: 2.29 K/UL
LYMPHOCYTES NFR BLD AUTO: 33.5 %
MAN DIFF?: NORMAL
MCHC RBC-ENTMCNC: 32.1 GM/DL
MCHC RBC-ENTMCNC: 32.5 PG
MCV RBC AUTO: 101.1 FL
MONOCYTES # BLD AUTO: 0.7 K/UL
MONOCYTES NFR BLD AUTO: 10.2 %
NEUTROPHILS # BLD AUTO: 3.7 K/UL
NEUTROPHILS NFR BLD AUTO: 54.2 %
NITRITE URINE: NEGATIVE
PH URINE: 5.5
PLATELET # BLD AUTO: 112 K/UL
POTASSIUM SERPL-SCNC: 5.6 MMOL/L
PROT SERPL-MCNC: 6.7 G/DL
PROTEIN URINE: NORMAL MG/DL
PT BLD: 14.4 SEC
RBC # BLD: 4.37 M/UL
RBC # FLD: 13.6 %
SODIUM SERPL-SCNC: 134 MMOL/L
SPECIFIC GRAVITY URINE: 1.01
UROBILINOGEN URINE: 0.2 MG/DL
WBC # FLD AUTO: 6.84 K/UL

## 2023-08-23 NOTE — ASSESSMENT
[FreeTextEntry1] : Low BP , no edema, nauseas and with malaise.   Unclear cause of right sided back pain, nausea and malaise wiht low BP.   Check labs - cbc, cmp, urine xrays - thoracic, L/s Spice urine Met with patient and HCP to discuss pts status and possiblity of next phases in light of feeling poorly. Goals of care discussion held and MOLST orders reviewed. twenty min discussion held. Pt is DNR/DNI. MOLST form in chart.

## 2023-08-23 NOTE — REVIEW OF SYSTEMS
[Fever] : no fever [Chills] : no chills [Feeling Poorly] : feeling poorly [Feeling Tired] : feeling tired [Negative] : Heme/Lymph

## 2023-08-23 NOTE — HISTORY OF PRESENT ILLNESS
[FreeTextEntry1] : 89 yo female comes in with new right sided back pain, nausea and feeling "malaise". No falls, Pt eating well. No leg edema. No new meds. No fever or cough. No urinary symptoms.  Pt is retired geriatrician. In discussing how she feels we discussed goals of care. She is clear on wishes... dnr and dni.  Pt does not want to go to hospital. Dtr, dr. Jo Ann Aly , is HCP and aware of pt wishes. [No falls in past year] : Patient reported no falls in the past year [Completely Independent] : Completely independent. [Designated Healthcare Proxy] : Designated healthcare proxy [Name: ___] : Health Care Proxy's Name: [unfilled]  [Relationship: ___] : Relationship: [unfilled] [DNR] : DNR [DNI] : DNI [I will adhere to the patient's wishes.] : I will adhere to the patient's wishes. [Time Spent: ___ minutes] : Time Spent: [unfilled] minutes

## 2023-08-28 ENCOUNTER — NON-APPOINTMENT (OUTPATIENT)
Age: 88
End: 2023-08-28

## 2023-08-28 LAB
ALBUMIN SERPL ELPH-MCNC: 3.8 G/DL
ALP BLD-CCNC: 66 U/L
ALT SERPL-CCNC: 14 U/L
ANION GAP SERPL CALC-SCNC: 13 MMOL/L
AST SERPL-CCNC: 22 U/L
BILIRUB SERPL-MCNC: 0.4 MG/DL
BUN SERPL-MCNC: 60 MG/DL
CALCIUM SERPL-MCNC: 9.3 MG/DL
CHLORIDE SERPL-SCNC: 99 MMOL/L
CO2 SERPL-SCNC: 22 MMOL/L
CREAT SERPL-MCNC: 2.26 MG/DL
EGFR: 20 ML/MIN/1.73M2
GLUCOSE SERPL-MCNC: 157 MG/DL
POTASSIUM SERPL-SCNC: 5.3 MMOL/L
PROT SERPL-MCNC: 5.8 G/DL
SODIUM SERPL-SCNC: 134 MMOL/L

## 2023-08-30 ENCOUNTER — LABORATORY RESULT (OUTPATIENT)
Age: 88
End: 2023-08-30

## 2023-09-05 ENCOUNTER — TRANSCRIPTION ENCOUNTER (OUTPATIENT)
Age: 88
End: 2023-09-05

## 2023-10-26 ENCOUNTER — APPOINTMENT (OUTPATIENT)
Dept: GERIATRICS | Facility: CLINIC | Age: 88
End: 2023-10-26
Payer: MEDICARE

## 2023-10-26 VITALS
RESPIRATION RATE: 14 BRPM | WEIGHT: 134 LBS | OXYGEN SATURATION: 95 % | SYSTOLIC BLOOD PRESSURE: 120 MMHG | DIASTOLIC BLOOD PRESSURE: 79 MMHG | BODY MASS INDEX: 25.32 KG/M2 | HEART RATE: 87 BPM | TEMPERATURE: 98.3 F

## 2023-10-26 DIAGNOSIS — R26.81 UNSTEADINESS ON FEET: ICD-10-CM

## 2023-10-26 DIAGNOSIS — D69.6 THROMBOCYTOPENIA, UNSPECIFIED: ICD-10-CM

## 2023-10-26 DIAGNOSIS — Z86.718 PERSONAL HISTORY OF OTHER VENOUS THROMBOSIS AND EMBOLISM: ICD-10-CM

## 2023-10-26 DIAGNOSIS — I48.91 UNSPECIFIED ATRIAL FIBRILLATION: ICD-10-CM

## 2023-10-26 PROCEDURE — 99214 OFFICE O/P EST MOD 30 MIN: CPT

## 2023-10-26 PROCEDURE — G0444 DEPRESSION SCREEN ANNUAL: CPT

## 2023-10-26 RX ORDER — METOPROLOL SUCCINATE 25 MG/1
25 TABLET, EXTENDED RELEASE ORAL
Refills: 5 | Status: ACTIVE | COMMUNITY
Start: 2021-09-02

## 2023-10-26 RX ORDER — EMPAGLIFLOZIN 10 MG/1
10 TABLET, FILM COATED ORAL DAILY
Refills: 0 | Status: ACTIVE | COMMUNITY

## 2023-10-26 RX ORDER — SACUBITRIL AND VALSARTAN 24; 26 MG/1; MG/1
24-26 TABLET, FILM COATED ORAL TWICE DAILY
Refills: 0 | Status: ACTIVE | COMMUNITY
Start: 2021-11-09

## 2023-10-26 RX ORDER — FLUTICASONE PROPIONATE 50 UG/1
50 SPRAY, METERED NASAL DAILY
Refills: 2 | Status: ACTIVE | COMMUNITY
Start: 2022-08-09

## 2023-10-26 RX ORDER — APIXABAN 2.5 MG/1
2.5 TABLET, FILM COATED ORAL
Refills: 0 | Status: ACTIVE | COMMUNITY

## 2023-10-26 RX ORDER — ATORVASTATIN CALCIUM 20 MG/1
20 TABLET, FILM COATED ORAL
Refills: 0 | Status: ACTIVE | COMMUNITY
Start: 2023-02-13

## 2023-10-26 RX ORDER — SPIRONOLACTONE 25 MG/1
25 TABLET ORAL
Refills: 0 | Status: ACTIVE | COMMUNITY
Start: 2023-06-15

## 2023-10-26 RX ORDER — TORSEMIDE 20 MG/1
20 TABLET ORAL
Refills: 0 | Status: ACTIVE | COMMUNITY
Start: 2023-02-13

## 2023-10-27 LAB
25(OH)D3 SERPL-MCNC: 23.1 NG/ML
ALBUMIN SERPL ELPH-MCNC: 4.1 G/DL
ALP BLD-CCNC: 113 U/L
ALT SERPL-CCNC: 16 U/L
ANION GAP SERPL CALC-SCNC: 16 MMOL/L
AST SERPL-CCNC: 20 U/L
BILIRUB SERPL-MCNC: 0.4 MG/DL
BUN SERPL-MCNC: 41 MG/DL
CALCIUM SERPL-MCNC: 9.5 MG/DL
CHLORIDE SERPL-SCNC: 107 MMOL/L
CO2 SERPL-SCNC: 20 MMOL/L
CREAT SERPL-MCNC: 1.49 MG/DL
EGFR: 33 ML/MIN/1.73M2
FOLATE SERPL-MCNC: 9.5 NG/ML
GLUCOSE SERPL-MCNC: 107 MG/DL
HCT VFR BLD CALC: 40.5 %
HGB BLD-MCNC: 13.3 G/DL
MCHC RBC-ENTMCNC: 32.8 GM/DL
MCHC RBC-ENTMCNC: 32.8 PG
MCV RBC AUTO: 100 FL
NT-PROBNP SERPL-MCNC: 2523 PG/ML
PLATELET # BLD AUTO: 101 K/UL
POTASSIUM SERPL-SCNC: 4.3 MMOL/L
PROT SERPL-MCNC: 6.2 G/DL
RBC # BLD: 4.05 M/UL
RBC # FLD: 14.1 %
SODIUM SERPL-SCNC: 142 MMOL/L
T4 FREE SERPL-MCNC: 1.1 NG/DL
TSH SERPL-ACNC: 1.73 UIU/ML
VIT B12 SERPL-MCNC: 265 PG/ML
WBC # FLD AUTO: 7.61 K/UL

## 2023-10-30 ENCOUNTER — APPOINTMENT (OUTPATIENT)
Dept: VASCULAR SURGERY | Facility: CLINIC | Age: 88
End: 2023-10-30
Payer: MEDICARE

## 2023-10-30 DIAGNOSIS — T14.8XXA OTHER INJURY OF UNSPECIFIED BODY REGION, INITIAL ENCOUNTER: ICD-10-CM

## 2023-10-30 PROCEDURE — 99212 OFFICE O/P EST SF 10 MIN: CPT

## 2023-10-31 PROBLEM — T14.8XXA TRAUMATIC ABRASION: Status: ACTIVE | Noted: 2023-10-31

## 2023-11-06 ENCOUNTER — APPOINTMENT (OUTPATIENT)
Dept: VASCULAR SURGERY | Facility: CLINIC | Age: 88
End: 2023-11-06
Payer: MEDICARE

## 2023-11-06 VITALS
DIASTOLIC BLOOD PRESSURE: 48 MMHG | HEIGHT: 61 IN | SYSTOLIC BLOOD PRESSURE: 79 MMHG | HEART RATE: 80 BPM | WEIGHT: 134 LBS | BODY MASS INDEX: 25.3 KG/M2

## 2023-11-06 DIAGNOSIS — S41.119A LACERATION W/OUT FOREIGN BODY OF UNSPECIFIED UPPER ARM, INITIAL ENCOUNTER: ICD-10-CM

## 2023-11-06 PROCEDURE — 99212 OFFICE O/P EST SF 10 MIN: CPT

## 2024-01-24 ENCOUNTER — TRANSCRIPTION ENCOUNTER (OUTPATIENT)
Age: 89
End: 2024-01-24

## 2024-01-25 ENCOUNTER — TRANSCRIPTION ENCOUNTER (OUTPATIENT)
Age: 89
End: 2024-01-25

## 2024-03-28 ENCOUNTER — APPOINTMENT (OUTPATIENT)
Dept: GERIATRICS | Facility: CLINIC | Age: 89
End: 2024-03-28
Payer: MEDICARE

## 2024-03-28 VITALS
HEART RATE: 78 BPM | TEMPERATURE: 98.2 F | RESPIRATION RATE: 14 BRPM | SYSTOLIC BLOOD PRESSURE: 104 MMHG | DIASTOLIC BLOOD PRESSURE: 67 MMHG | BODY MASS INDEX: 25.13 KG/M2 | WEIGHT: 133 LBS

## 2024-03-28 VITALS — OXYGEN SATURATION: 97 %

## 2024-03-28 DIAGNOSIS — F32.A DEPRESSION, UNSPECIFIED: ICD-10-CM

## 2024-03-28 DIAGNOSIS — E78.9 DISORDER OF LIPOPROTEIN METABOLISM, UNSPECIFIED: ICD-10-CM

## 2024-03-28 PROCEDURE — 99214 OFFICE O/P EST MOD 30 MIN: CPT

## 2024-03-28 NOTE — HISTORY OF PRESENT ILLNESS
[FreeTextEntry1] : 89 yo female with bilateral wrist pain. Pt with nail changes. Pt also with CHF and CKD.  No falls. 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 [No falls in past year] : Patient reported no falls in the past year [Completely Independent] : Completely independent. [0] : 2) Feeling down, depressed, or hopeless: Not at all (0) [PHQ-2 Negative - No further assessment needed] : PHQ-2 Negative - No further assessment needed [[id number]] : 0

## 2024-03-29 LAB
25(OH)D3 SERPL-MCNC: 35.4 NG/ML
ALBUMIN SERPL ELPH-MCNC: 4 G/DL
ALP BLD-CCNC: 103 U/L
ALT SERPL-CCNC: 13 U/L
ANION GAP SERPL CALC-SCNC: 13 MMOL/L
AST SERPL-CCNC: 22 U/L
BILIRUB SERPL-MCNC: 0.5 MG/DL
BUN SERPL-MCNC: 33 MG/DL
CALCIUM SERPL-MCNC: 9.4 MG/DL
CHLORIDE SERPL-SCNC: 102 MMOL/L
CO2 SERPL-SCNC: 25 MMOL/L
CREAT SERPL-MCNC: 1.66 MG/DL
EGFR: 29 ML/MIN/1.73M2
ESTIMATED AVERAGE GLUCOSE: 126 MG/DL
FOLATE SERPL-MCNC: 12.8 NG/ML
GLUCOSE SERPL-MCNC: 103 MG/DL
HBA1C MFR BLD HPLC: 6 %
HCT VFR BLD CALC: 37.2 %
HGB BLD-MCNC: 12.3 G/DL
MCHC RBC-ENTMCNC: 31.5 PG
MCHC RBC-ENTMCNC: 33.1 GM/DL
MCV RBC AUTO: 95.4 FL
NT-PROBNP SERPL-MCNC: 2194 PG/ML
PLATELET # BLD AUTO: 106 K/UL
POTASSIUM SERPL-SCNC: 4.4 MMOL/L
PROT SERPL-MCNC: 6.3 G/DL
RBC # BLD: 3.9 M/UL
RBC # FLD: 15 %
RHEUMATOID FACT SER QL: 22 IU/ML
SODIUM SERPL-SCNC: 140 MMOL/L
TSH SERPL-ACNC: 3.2 UIU/ML
VIT B12 SERPL-MCNC: 398 PG/ML
WBC # FLD AUTO: 5.75 K/UL

## 2024-04-01 LAB
CRP SERPL-MCNC: <3 MG/L
ERYTHROCYTE [SEDIMENTATION RATE] IN BLOOD BY WESTERGREN METHOD: 19 MM/HR

## 2024-04-04 ENCOUNTER — NON-APPOINTMENT (OUTPATIENT)
Age: 89
End: 2024-04-04

## 2024-04-04 LAB
ANA PAT FLD IF-IMP: ABNORMAL
ANA SER IF-ACNC: ABNORMAL

## 2024-04-05 ENCOUNTER — APPOINTMENT (OUTPATIENT)
Dept: RHEUMATOLOGY | Facility: CLINIC | Age: 89
End: 2024-04-05
Payer: MEDICARE

## 2024-04-05 ENCOUNTER — OUTPATIENT (OUTPATIENT)
Dept: OUTPATIENT SERVICES | Facility: HOSPITAL | Age: 89
LOS: 1 days | End: 2024-04-05

## 2024-04-05 ENCOUNTER — APPOINTMENT (OUTPATIENT)
Dept: RADIOLOGY | Facility: CLINIC | Age: 89
End: 2024-04-05
Payer: MEDICARE

## 2024-04-05 VITALS
OXYGEN SATURATION: 97 % | DIASTOLIC BLOOD PRESSURE: 64 MMHG | SYSTOLIC BLOOD PRESSURE: 101 MMHG | HEIGHT: 61 IN | TEMPERATURE: 98.1 F | BODY MASS INDEX: 24.73 KG/M2 | HEART RATE: 71 BPM | WEIGHT: 131 LBS

## 2024-04-05 DIAGNOSIS — Z13.820 ENCOUNTER FOR SCREENING FOR OSTEOPOROSIS: ICD-10-CM

## 2024-04-05 PROCEDURE — G2211 COMPLEX E/M VISIT ADD ON: CPT

## 2024-04-05 PROCEDURE — 73120 X-RAY EXAM OF HAND: CPT | Mod: 26,50

## 2024-04-05 PROCEDURE — 99203 OFFICE O/P NEW LOW 30 MIN: CPT

## 2024-04-05 PROCEDURE — 77080 DXA BONE DENSITY AXIAL: CPT | Mod: 26

## 2024-04-05 RX ORDER — FLUTICASONE PROPIONATE AND SALMETEROL 50; 100 UG/1; UG/1
100-50 POWDER RESPIRATORY (INHALATION)
Refills: 0 | Status: DISCONTINUED | COMMUNITY
Start: 2023-06-15 | End: 2024-04-05

## 2024-04-19 NOTE — HISTORY OF PRESENT ILLNESS
[FreeTextEntry1] : April 5, 2024 89 year old woman referred for initial evaluation. Referred by PCP Dr. 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  Previous amniotior toxicity, trouble breathing, better now Previous surgery in feet  history of left wrist fracture  Family history of psoriasis (father) No dietary restrictions Patient is right hand dominant Patient used to be an internist and geriatrician, retired in 2014 Reviewed xrays of lumbar spine, thoracic spine

## 2024-04-19 NOTE — ASSESSMENT
[FreeTextEntry1] : 89 year old woman referred for initial evaluation. Patient presents with about 6 months of wrist pain, with some swelling in fingers as that has since improved though not fully resolved. Patient with history of left wrist fracture and lumbar spine scoliosis. Patient with prior history of psoriasis, no active skin lesions at this time, but does present with pitting of nails, follows with dermatology. Also with history of osteoporosis, previously treated but not for many years. Will order bone density, discussed possibility of restarting medication pending results. Degenerative changes of the hands present, possible inflammatory component as well, will obtain xrays of the bilateral hands as well. Patient referred to occupational therapy. Further management pending results.  Addendum: Reviewed results of xrays with patient as well as DEXa.  Trial of low dose prednisone as unable to take nsaids, patient will send message next week in portal to provide update, possible CPPD vs psoriasis vs worsening degenerative changes.

## 2024-04-19 NOTE — ADDENDUM
[FreeTextEntry1] : I, Lacho Godfrey, documented this note as a scribe on behalf of Dr. Marilou Quiroz MD on 04/05/2024.

## 2024-04-19 NOTE — END OF VISIT
[Time Spent: ___ minutes] : I have spent [unfilled] minutes of time on the encounter. [FreeTextEntry3] : All medical record entries made by the Scribe were at my, Dr. Marilou Quiroz MD, direction and personally dictated by me on 04/05/2024. I have reviewed the chart and agree that the record accurately reflects my personal performance of the history, physical exam, assessment and plan. I have also personally directed, reviewed, and agreed with the chart.

## 2024-04-19 NOTE — REVIEW OF SYSTEMS
[Negative] : Heme/Lymph [Joint Pain] : joint pain [Joint Stiffness] : joint stiffness [FreeTextEntry9] : pain in wrist, swelling in fingers [de-identified] : cracking and pitting in nails

## 2024-04-19 NOTE — PHYSICAL EXAM
[General Appearance - Alert] : alert [General Appearance - In No Acute Distress] : in no acute distress [General Appearance - Well Nourished] : well nourished [General Appearance - Well Developed] : well developed [General Appearance - Well-Appearing] : healthy appearing [Sclera] : the sclera and conjunctiva were normal [Respiration, Rhythm And Depth] : normal respiratory rhythm and effort [Exaggerated Use Of Accessory Muscles For Inspiration] : no accessory muscle use [Abnormal Walk] : normal gait [Nail Clubbing] : no clubbing  or cyanosis of the fingernails [] : no rash [Oriented To Time, Place, And Person] : oriented to person, place, and time [Impaired Insight] : insight and judgment were intact [Affect] : the affect was normal [FreeTextEntry1] : cracking and pitting of nails, no skin psoriasis

## 2024-04-23 ENCOUNTER — TRANSCRIPTION ENCOUNTER (OUTPATIENT)
Age: 89
End: 2024-04-23

## 2024-04-23 RX ORDER — PREDNISONE 10 MG/1
10 TABLET ORAL DAILY
Qty: 5 | Refills: 0 | Status: ACTIVE | COMMUNITY
Start: 2024-04-23 | End: 1900-01-01

## 2024-04-30 ENCOUNTER — TRANSCRIPTION ENCOUNTER (OUTPATIENT)
Age: 89
End: 2024-04-30

## 2024-05-01 ENCOUNTER — TRANSCRIPTION ENCOUNTER (OUTPATIENT)
Age: 89
End: 2024-05-01

## 2024-05-01 DIAGNOSIS — M25.531 PAIN IN RIGHT WRIST: ICD-10-CM

## 2024-05-01 DIAGNOSIS — M25.532 PAIN IN RIGHT WRIST: ICD-10-CM

## 2024-05-02 ENCOUNTER — TRANSCRIPTION ENCOUNTER (OUTPATIENT)
Age: 89
End: 2024-05-02

## 2024-05-02 RX ORDER — HYDROXYCHLOROQUINE SULFATE 200 MG/1
200 TABLET, FILM COATED ORAL
Qty: 30 | Refills: 3 | Status: ACTIVE | COMMUNITY
Start: 2024-05-02 | End: 1900-01-01

## 2024-05-09 ENCOUNTER — TRANSCRIPTION ENCOUNTER (OUTPATIENT)
Age: 89
End: 2024-05-09

## 2024-05-10 ENCOUNTER — TRANSCRIPTION ENCOUNTER (OUTPATIENT)
Age: 89
End: 2024-05-10

## 2024-06-27 ENCOUNTER — APPOINTMENT (OUTPATIENT)
Dept: GERIATRICS | Facility: CLINIC | Age: 89
End: 2024-06-27
Payer: MEDICARE

## 2024-06-27 VITALS
TEMPERATURE: 97.2 F | DIASTOLIC BLOOD PRESSURE: 75 MMHG | RESPIRATION RATE: 14 BRPM | SYSTOLIC BLOOD PRESSURE: 110 MMHG | BODY MASS INDEX: 24.37 KG/M2 | HEART RATE: 72 BPM | OXYGEN SATURATION: 96 % | WEIGHT: 129 LBS

## 2024-06-27 DIAGNOSIS — I50.9 HEART FAILURE, UNSPECIFIED: ICD-10-CM

## 2024-06-27 DIAGNOSIS — D69.6 THROMBOCYTOPENIA, UNSPECIFIED: ICD-10-CM

## 2024-06-27 DIAGNOSIS — M19.90 UNSPECIFIED OSTEOARTHRITIS, UNSPECIFIED SITE: ICD-10-CM

## 2024-06-27 DIAGNOSIS — J44.9 CHRONIC OBSTRUCTIVE PULMONARY DISEASE, UNSPECIFIED: ICD-10-CM

## 2024-06-27 DIAGNOSIS — L71.9 ROSACEA, UNSPECIFIED: ICD-10-CM

## 2024-06-27 DIAGNOSIS — N18.9 CHRONIC KIDNEY DISEASE, UNSPECIFIED: ICD-10-CM

## 2024-06-27 DIAGNOSIS — M79.641 PAIN IN RIGHT HAND: ICD-10-CM

## 2024-06-27 DIAGNOSIS — R73.9 HYPERGLYCEMIA, UNSPECIFIED: ICD-10-CM

## 2024-06-27 DIAGNOSIS — M79.642 PAIN IN RIGHT HAND: ICD-10-CM

## 2024-06-27 PROCEDURE — 99214 OFFICE O/P EST MOD 30 MIN: CPT

## 2024-06-27 PROCEDURE — G2211 COMPLEX E/M VISIT ADD ON: CPT

## 2024-06-28 PROBLEM — L71.9 ROSACEA: Status: ACTIVE | Noted: 2024-06-27

## 2024-06-28 PROBLEM — M19.90 ARTHRITIS: Status: ACTIVE | Noted: 2024-03-28

## 2024-06-28 PROBLEM — I50.9 CHF (CONGESTIVE HEART FAILURE): Status: ACTIVE | Noted: 2022-08-09

## 2024-06-28 PROBLEM — J44.9 COPD (CHRONIC OBSTRUCTIVE PULMONARY DISEASE): Status: ACTIVE | Noted: 2022-08-18

## 2024-06-28 PROBLEM — N18.9 CHRONIC KIDNEY DISEASE, UNSPECIFIED CKD STAGE: Status: ACTIVE | Noted: 2022-12-15

## 2024-06-28 PROBLEM — M79.641 BILATERAL HAND PAIN: Status: ACTIVE | Noted: 2024-04-05

## 2024-06-28 PROBLEM — D69.6 PLATELETS DECREASED: Status: ACTIVE | Noted: 2022-08-09

## 2024-07-01 NOTE — PHYSICAL EXAM
[Respiratory Effort] : normal respiratory effort [Normal Rate and Rhythm] : normal rate and rhythm [Ankle Swelling (On Exam)] : present [Ankle Swelling Bilaterally] : bilaterally  [Ankle Swelling On The Left] : moderate [] : bilaterally [Ankle Swelling On The Right] : mild [Skin Ulcer] : ulcer [Alert] : alert [Oriented to Person] : oriented to person [Oriented to Place] : oriented to place [Oriented to Time] : oriented to time [Calm] : calm [Abdomen Tenderness] : ~T ~M No abdominal tenderness [Purpura] : no purpura  [Petechiae] : no petechiae [Skin Induration] : no induration [de-identified] : WN/WD [FreeTextEntry1] : Overall skin very frail and thin. RLE knee w/superficial skin avulsion healed. LLE lateral wound w/stitches in place, small eschar noted at mid-wound, no erythema, no drainage, improving. Bilt moderate to severe swelling, persistent but mainly from below knee to foot.  [de-identified] : FROM throughout, strength 5/5x4 [de-identified] : See cardiovascular section  [de-identified] : grossly intact yes

## 2024-12-27 ENCOUNTER — TRANSCRIPTION ENCOUNTER (OUTPATIENT)
Age: 88
End: 2024-12-27

## 2024-12-30 ENCOUNTER — TRANSCRIPTION ENCOUNTER (OUTPATIENT)
Age: 88
End: 2024-12-30

## 2024-12-31 ENCOUNTER — TRANSCRIPTION ENCOUNTER (OUTPATIENT)
Age: 88
End: 2024-12-31

## 2025-01-02 ENCOUNTER — TRANSCRIPTION ENCOUNTER (OUTPATIENT)
Age: 89
End: 2025-01-02

## 2025-01-23 ENCOUNTER — APPOINTMENT (OUTPATIENT)
Dept: GERIATRICS | Facility: CLINIC | Age: 89
End: 2025-01-23
Payer: MEDICARE

## 2025-01-23 VITALS
RESPIRATION RATE: 16 BRPM | BODY MASS INDEX: 25.77 KG/M2 | OXYGEN SATURATION: 96 % | TEMPERATURE: 97.5 F | SYSTOLIC BLOOD PRESSURE: 110 MMHG | WEIGHT: 136.5 LBS | HEART RATE: 71 BPM | HEIGHT: 61 IN | DIASTOLIC BLOOD PRESSURE: 69 MMHG

## 2025-01-23 DIAGNOSIS — M79.642 PAIN IN RIGHT HAND: ICD-10-CM

## 2025-01-23 DIAGNOSIS — M19.90 UNSPECIFIED OSTEOARTHRITIS, UNSPECIFIED SITE: ICD-10-CM

## 2025-01-23 DIAGNOSIS — I50.9 HEART FAILURE, UNSPECIFIED: ICD-10-CM

## 2025-01-23 DIAGNOSIS — R60.0 LOCALIZED EDEMA: ICD-10-CM

## 2025-01-23 DIAGNOSIS — I87.331 CHRONIC VENOUS HYPERTENSION (IDIOPATHIC) WITH ULCER AND INFLAMMATION OF RIGHT LOWER EXTREMITY: ICD-10-CM

## 2025-01-23 DIAGNOSIS — M79.641 PAIN IN RIGHT HAND: ICD-10-CM

## 2025-01-23 PROCEDURE — 99214 OFFICE O/P EST MOD 30 MIN: CPT

## 2025-01-23 PROCEDURE — G2211 COMPLEX E/M VISIT ADD ON: CPT

## 2025-02-07 ENCOUNTER — APPOINTMENT (OUTPATIENT)
Dept: VASCULAR SURGERY | Facility: CLINIC | Age: 89
End: 2025-02-07

## 2025-02-07 VITALS
SYSTOLIC BLOOD PRESSURE: 101 MMHG | WEIGHT: 136 LBS | HEIGHT: 61 IN | DIASTOLIC BLOOD PRESSURE: 62 MMHG | HEART RATE: 69 BPM | BODY MASS INDEX: 25.68 KG/M2

## 2025-02-07 VITALS
HEIGHT: 61 IN | BODY MASS INDEX: 25.68 KG/M2 | WEIGHT: 136 LBS | DIASTOLIC BLOOD PRESSURE: 50 MMHG | SYSTOLIC BLOOD PRESSURE: 78 MMHG

## 2025-02-07 DIAGNOSIS — S41.119A LACERATION W/OUT FOREIGN BODY OF UNSPECIFIED UPPER ARM, INITIAL ENCOUNTER: ICD-10-CM

## 2025-02-07 PROCEDURE — 99212 OFFICE O/P EST SF 10 MIN: CPT

## 2025-02-11 ENCOUNTER — APPOINTMENT (OUTPATIENT)
Dept: VASCULAR SURGERY | Facility: CLINIC | Age: 89
End: 2025-02-11
Payer: MEDICARE

## 2025-02-11 PROCEDURE — 99212 OFFICE O/P EST SF 10 MIN: CPT

## 2025-02-14 ENCOUNTER — APPOINTMENT (OUTPATIENT)
Dept: VASCULAR SURGERY | Facility: CLINIC | Age: 89
End: 2025-02-14

## 2025-02-14 VITALS
DIASTOLIC BLOOD PRESSURE: 77 MMHG | HEIGHT: 61 IN | WEIGHT: 136 LBS | SYSTOLIC BLOOD PRESSURE: 117 MMHG | BODY MASS INDEX: 25.68 KG/M2 | HEART RATE: 90 BPM

## 2025-02-14 PROCEDURE — 99212 OFFICE O/P EST SF 10 MIN: CPT

## 2025-02-18 ENCOUNTER — APPOINTMENT (OUTPATIENT)
Dept: VASCULAR SURGERY | Facility: CLINIC | Age: 89
End: 2025-02-18
Payer: MEDICARE

## 2025-02-18 VITALS
SYSTOLIC BLOOD PRESSURE: 94 MMHG | HEIGHT: 61 IN | WEIGHT: 136 LBS | DIASTOLIC BLOOD PRESSURE: 61 MMHG | BODY MASS INDEX: 25.68 KG/M2 | HEART RATE: 84 BPM

## 2025-02-18 PROCEDURE — 99212 OFFICE O/P EST SF 10 MIN: CPT

## 2025-02-21 ENCOUNTER — APPOINTMENT (OUTPATIENT)
Dept: VASCULAR SURGERY | Facility: CLINIC | Age: 89
End: 2025-02-21
Payer: MEDICARE

## 2025-02-21 VITALS
BODY MASS INDEX: 25.68 KG/M2 | WEIGHT: 136 LBS | HEIGHT: 61 IN | SYSTOLIC BLOOD PRESSURE: 106 MMHG | HEART RATE: 86 BPM | DIASTOLIC BLOOD PRESSURE: 74 MMHG

## 2025-02-21 DIAGNOSIS — S41.119A LACERATION W/OUT FOREIGN BODY OF UNSPECIFIED UPPER ARM, INITIAL ENCOUNTER: ICD-10-CM

## 2025-02-21 PROCEDURE — 99212 OFFICE O/P EST SF 10 MIN: CPT

## 2025-04-21 ENCOUNTER — NON-APPOINTMENT (OUTPATIENT)
Age: 89
End: 2025-04-21

## 2025-04-24 ENCOUNTER — APPOINTMENT (OUTPATIENT)
Dept: OTOLARYNGOLOGY | Facility: CLINIC | Age: 89
End: 2025-04-24
Payer: MEDICARE

## 2025-04-24 VITALS
BODY MASS INDEX: 25.68 KG/M2 | TEMPERATURE: 98 F | OXYGEN SATURATION: 98 % | HEART RATE: 73 BPM | SYSTOLIC BLOOD PRESSURE: 113 MMHG | HEIGHT: 61 IN | WEIGHT: 136 LBS | DIASTOLIC BLOOD PRESSURE: 68 MMHG

## 2025-04-24 DIAGNOSIS — Z86.79 PERSONAL HISTORY OF OTHER DISEASES OF THE CIRCULATORY SYSTEM: ICD-10-CM

## 2025-04-24 DIAGNOSIS — J31.0 CHRONIC RHINITIS: ICD-10-CM

## 2025-04-24 DIAGNOSIS — H61.21 IMPACTED CERUMEN, RIGHT EAR: ICD-10-CM

## 2025-04-24 DIAGNOSIS — R09.89 OTHER SPECIFIED SYMPTOMS AND SIGNS INVOLVING THE CIRCULATORY AND RESPIRATORY SYSTEMS: ICD-10-CM

## 2025-04-24 DIAGNOSIS — L72.0 EPIDERMAL CYST: ICD-10-CM

## 2025-04-24 DIAGNOSIS — Z87.448 PERSONAL HISTORY OF OTHER DISEASES OF URINARY SYSTEM: ICD-10-CM

## 2025-04-24 PROCEDURE — 99203 OFFICE O/P NEW LOW 30 MIN: CPT | Mod: 25

## 2025-04-24 PROCEDURE — 69210 REMOVE IMPACTED EAR WAX UNI: CPT

## 2025-04-24 RX ORDER — IPRATROPIUM BROMIDE 21 UG/1
0.03 SPRAY, METERED NASAL
Qty: 3 | Refills: 0 | Status: ACTIVE | COMMUNITY
Start: 2025-04-24 | End: 1900-01-01

## 2025-04-25 PROBLEM — L72.0 EPIDERMAL INCLUSION CYST: Status: ACTIVE | Noted: 2025-04-25

## 2025-04-25 PROBLEM — H61.21 CERUMINOSIS, RIGHT: Status: ACTIVE | Noted: 2025-04-25

## 2025-06-05 ENCOUNTER — APPOINTMENT (OUTPATIENT)
Dept: OTOLARYNGOLOGY | Facility: CLINIC | Age: 89
End: 2025-06-05
Payer: MEDICARE

## 2025-06-05 VITALS
BODY MASS INDEX: 24.73 KG/M2 | DIASTOLIC BLOOD PRESSURE: 59 MMHG | SYSTOLIC BLOOD PRESSURE: 94 MMHG | HEIGHT: 61 IN | HEART RATE: 80 BPM | OXYGEN SATURATION: 96 % | WEIGHT: 131 LBS | TEMPERATURE: 98 F

## 2025-06-05 DIAGNOSIS — J31.0 CHRONIC RHINITIS: ICD-10-CM

## 2025-06-05 PROCEDURE — 99213 OFFICE O/P EST LOW 20 MIN: CPT

## 2025-07-29 ENCOUNTER — RX RENEWAL (OUTPATIENT)
Age: 89
End: 2025-07-29

## 2025-09-02 ENCOUNTER — NON-APPOINTMENT (OUTPATIENT)
Age: 89
End: 2025-09-02

## 2025-09-04 ENCOUNTER — APPOINTMENT (OUTPATIENT)
Dept: OTOLARYNGOLOGY | Facility: CLINIC | Age: 89
End: 2025-09-04
Payer: MEDICARE

## 2025-09-04 VITALS
HEART RATE: 75 BPM | SYSTOLIC BLOOD PRESSURE: 94 MMHG | HEIGHT: 65 IN | TEMPERATURE: 98 F | DIASTOLIC BLOOD PRESSURE: 59 MMHG | OXYGEN SATURATION: 96 %

## 2025-09-04 DIAGNOSIS — H61.21 IMPACTED CERUMEN, RIGHT EAR: ICD-10-CM

## 2025-09-04 DIAGNOSIS — J31.0 CHRONIC RHINITIS: ICD-10-CM

## 2025-09-04 DIAGNOSIS — L72.0 EPIDERMAL CYST: ICD-10-CM

## 2025-09-04 PROCEDURE — 69210 REMOVE IMPACTED EAR WAX UNI: CPT

## 2025-09-04 PROCEDURE — 99213 OFFICE O/P EST LOW 20 MIN: CPT | Mod: 25

## 2025-09-05 ENCOUNTER — LABORATORY RESULT (OUTPATIENT)
Age: 89
End: 2025-09-05